# Patient Record
Sex: MALE | Race: WHITE | NOT HISPANIC OR LATINO | Employment: FULL TIME | URBAN - NONMETROPOLITAN AREA
[De-identification: names, ages, dates, MRNs, and addresses within clinical notes are randomized per-mention and may not be internally consistent; named-entity substitution may affect disease eponyms.]

---

## 2022-08-22 ENCOUNTER — HOSPITAL ENCOUNTER (EMERGENCY)
Facility: HOSPITAL | Age: 37
Discharge: HOME | End: 2022-08-22
Attending: STUDENT IN AN ORGANIZED HEALTH CARE EDUCATION/TRAINING PROGRAM | Admitting: STUDENT IN AN ORGANIZED HEALTH CARE EDUCATION/TRAINING PROGRAM

## 2022-08-22 ENCOUNTER — APPOINTMENT (OUTPATIENT)
Dept: ULTRASOUND IMAGING | Facility: HOSPITAL | Age: 37
End: 2022-08-22

## 2022-08-22 VITALS
OXYGEN SATURATION: 95 % | DIASTOLIC BLOOD PRESSURE: 74 MMHG | HEIGHT: 66 IN | WEIGHT: 315 LBS | RESPIRATION RATE: 20 BRPM | TEMPERATURE: 98.1 F | SYSTOLIC BLOOD PRESSURE: 133 MMHG | BODY MASS INDEX: 50.62 KG/M2 | HEART RATE: 81 BPM

## 2022-08-22 DIAGNOSIS — R60.9 PERIPHERAL EDEMA: Primary | ICD-10-CM

## 2022-08-22 LAB
ALBUMIN SERPL-MCNC: 4.11 G/DL (ref 3.5–5.2)
ALBUMIN/GLOB SERPL: 1.5 G/DL
ALP SERPL-CCNC: 80 U/L (ref 39–117)
ALT SERPL W P-5'-P-CCNC: 30 U/L (ref 1–41)
ANION GAP SERPL CALCULATED.3IONS-SCNC: 7 MMOL/L (ref 5–15)
AST SERPL-CCNC: 22 U/L (ref 1–40)
BASOPHILS # BLD AUTO: 0.05 10*3/MM3 (ref 0–0.2)
BASOPHILS NFR BLD AUTO: 0.5 % (ref 0–1.5)
BILIRUB SERPL-MCNC: 0.2 MG/DL (ref 0–1.2)
BUN SERPL-MCNC: 17 MG/DL (ref 6–20)
BUN/CREAT SERPL: 22.7 (ref 7–25)
CALCIUM SPEC-SCNC: 9.5 MG/DL (ref 8.6–10.5)
CHLORIDE SERPL-SCNC: 106 MMOL/L (ref 98–107)
CO2 SERPL-SCNC: 26 MMOL/L (ref 22–29)
CREAT SERPL-MCNC: 0.75 MG/DL (ref 0.76–1.27)
CRP SERPL-MCNC: 1.35 MG/DL (ref 0–0.5)
DEPRECATED RDW RBC AUTO: 47.4 FL (ref 37–54)
EGFRCR SERPLBLD CKD-EPI 2021: 119.2 ML/MIN/1.73
EOSINOPHIL # BLD AUTO: 0.32 10*3/MM3 (ref 0–0.4)
EOSINOPHIL NFR BLD AUTO: 3 % (ref 0.3–6.2)
ERYTHROCYTE [DISTWIDTH] IN BLOOD BY AUTOMATED COUNT: 14.8 % (ref 12.3–15.4)
ERYTHROCYTE [SEDIMENTATION RATE] IN BLOOD: 32 MM/HR (ref 0–15)
GLOBULIN UR ELPH-MCNC: 2.7 GM/DL
GLUCOSE SERPL-MCNC: 110 MG/DL (ref 65–99)
HCT VFR BLD AUTO: 42.9 % (ref 37.5–51)
HGB BLD-MCNC: 13.4 G/DL (ref 13–17.7)
HOLD SPECIMEN: NORMAL
HOLD SPECIMEN: NORMAL
IMM GRANULOCYTES # BLD AUTO: 0.03 10*3/MM3 (ref 0–0.05)
IMM GRANULOCYTES NFR BLD AUTO: 0.3 % (ref 0–0.5)
LYMPHOCYTES # BLD AUTO: 3.08 10*3/MM3 (ref 0.7–3.1)
LYMPHOCYTES NFR BLD AUTO: 28.7 % (ref 19.6–45.3)
MCH RBC QN AUTO: 27.5 PG (ref 26.6–33)
MCHC RBC AUTO-ENTMCNC: 31.2 G/DL (ref 31.5–35.7)
MCV RBC AUTO: 88.1 FL (ref 79–97)
MONOCYTES # BLD AUTO: 1.01 10*3/MM3 (ref 0.1–0.9)
MONOCYTES NFR BLD AUTO: 9.4 % (ref 5–12)
NEUTROPHILS NFR BLD AUTO: 58.1 % (ref 42.7–76)
NEUTROPHILS NFR BLD AUTO: 6.25 10*3/MM3 (ref 1.7–7)
NRBC BLD AUTO-RTO: 0 /100 WBC (ref 0–0.2)
PLATELET # BLD AUTO: 285 10*3/MM3 (ref 140–450)
PMV BLD AUTO: 9.7 FL (ref 6–12)
POTASSIUM SERPL-SCNC: 3.9 MMOL/L (ref 3.5–5.2)
PROT SERPL-MCNC: 6.8 G/DL (ref 6–8.5)
RBC # BLD AUTO: 4.87 10*6/MM3 (ref 4.14–5.8)
SODIUM SERPL-SCNC: 139 MMOL/L (ref 136–145)
WBC NRBC COR # BLD: 10.74 10*3/MM3 (ref 3.4–10.8)
WHOLE BLOOD HOLD COAG: NORMAL
WHOLE BLOOD HOLD SPECIMEN: NORMAL

## 2022-08-22 PROCEDURE — 93971 EXTREMITY STUDY: CPT

## 2022-08-22 PROCEDURE — 36415 COLL VENOUS BLD VENIPUNCTURE: CPT

## 2022-08-22 PROCEDURE — 85025 COMPLETE CBC W/AUTO DIFF WBC: CPT | Performed by: PHYSICIAN ASSISTANT

## 2022-08-22 PROCEDURE — 85652 RBC SED RATE AUTOMATED: CPT | Performed by: PHYSICIAN ASSISTANT

## 2022-08-22 PROCEDURE — 99283 EMERGENCY DEPT VISIT LOW MDM: CPT

## 2022-08-22 PROCEDURE — 80053 COMPREHEN METABOLIC PANEL: CPT | Performed by: PHYSICIAN ASSISTANT

## 2022-08-22 PROCEDURE — 86140 C-REACTIVE PROTEIN: CPT | Performed by: PHYSICIAN ASSISTANT

## 2022-08-22 RX ORDER — CLOTRIMAZOLE AND BETAMETHASONE DIPROPIONATE 10; .64 MG/G; MG/G
1 CREAM TOPICAL 2 TIMES DAILY
Qty: 45 G | Refills: 0 | Status: SHIPPED | OUTPATIENT
Start: 2022-08-22

## 2022-08-22 RX ORDER — HYDROXYZINE PAMOATE 25 MG/1
25 CAPSULE ORAL NIGHTLY
Qty: 7 CAPSULE | Refills: 0 | Status: SHIPPED | OUTPATIENT
Start: 2022-08-22

## 2022-08-22 RX ORDER — FUROSEMIDE 20 MG/1
20 TABLET ORAL DAILY
Qty: 7 TABLET | Refills: 0 | Status: SHIPPED | OUTPATIENT
Start: 2022-08-22

## 2022-08-22 NOTE — ED NOTES
MEDICAL SCREENING:    Reason for Visit: right leg swelling and rash     Patient initially seen in triage.  The patient was advised further evaluation and diagnostic testing will be needed, some of the treatment and testing will be initiated in the lobby in order to begin the process.  The patient will be returned to the waiting area for the time being and possibly be re-assessed by a subsequent ED provider.  The patient will be brought back to the treatment area in as timely manner as possible.       Marisol Howell PA  08/22/22 [id number]

## 2022-08-23 NOTE — ED PROVIDER NOTES
Subjective   37-year-old male presents to the ER with chief complaint of bilateral leg swelling.  Patient states symptoms have been going on for the last several days.  Patient denies any new medications.  Patient is also complaining of a pruritic rash to his right leg.  Patient denies any new laundry detergents soaps or lotions.  Patient denies being outside.  Social history positive patient is a  and has been on his feet about the same as his normal daily activities require.          Review of Systems   Constitutional: Negative.  Negative for fever.   HENT: Negative.    Respiratory: Negative.    Cardiovascular: Positive for leg swelling. Negative for chest pain.   Gastrointestinal: Negative.  Negative for abdominal pain.   Endocrine: Negative.    Genitourinary: Negative.  Negative for dysuria.   Skin: Positive for rash.   Neurological: Negative.    Psychiatric/Behavioral: Negative.    All other systems reviewed and are negative.      No past medical history on file.    No Known Allergies    No past surgical history on file.    No family history on file.    Social History     Socioeconomic History   • Marital status:            Objective   Physical Exam  Vitals and nursing note reviewed.   Constitutional:       General: He is not in acute distress.     Appearance: He is well-developed. He is not diaphoretic.   HENT:      Head: Normocephalic and atraumatic.      Right Ear: External ear normal.      Left Ear: External ear normal.      Nose: Nose normal.   Eyes:      Conjunctiva/sclera: Conjunctivae normal.   Neck:      Vascular: No JVD.      Trachea: No tracheal deviation.   Cardiovascular:      Rate and Rhythm: Normal rate.      Heart sounds: No murmur heard.  Pulmonary:      Effort: Pulmonary effort is normal. No respiratory distress.      Breath sounds: No wheezing.   Abdominal:      Palpations: Abdomen is soft.      Tenderness: There is no abdominal tenderness.   Musculoskeletal:          General: No deformity. Normal range of motion.      Cervical back: Normal range of motion and neck supple.      Right lower leg: Edema present.      Left lower leg: Edema present.      Comments: Nonpitting edema bilaterally.  Could be more of a venous stasis insufficiency.   Skin:     General: Skin is warm and dry.      Coloration: Skin is not pale.      Findings: No erythema or rash.   Neurological:      Mental Status: He is alert and oriented to person, place, and time.      Cranial Nerves: No cranial nerve deficit.   Psychiatric:         Behavior: Behavior normal.         Thought Content: Thought content normal.         Procedures           ED Course  ED Course as of 08/22/22 2229   Mon Aug 22, 2022   2214 US rad interpreted:  No deep venous thrombus in the right lower extremity.      4.4 cm complex cystic structure right popliteal fossa compatible with a Baker's cyst. [RB]      ED Course User Index  [RB] Pop Quintero II, PA                                           MDM  Number of Diagnoses or Management Options  Peripheral edema: new and requires workup     Amount and/or Complexity of Data Reviewed  Clinical lab tests: ordered and reviewed  Tests in the radiology section of CPT®: ordered and reviewed    Risk of Complications, Morbidity, and/or Mortality  Presenting problems: low  Diagnostic procedures: low  Management options: low    Patient Progress  Patient progress: stable      Final diagnoses:   Peripheral edema       ED Disposition  ED Disposition     ED Disposition   Discharge    Condition   Stable    Comment   --             PATIENT CONNECTION - [facility]  See Provider List  [place] [address]  [phone]  Schedule an appointment as soon as possible for a visit            Medication List      New Prescriptions    clotrimazole-betamethasone 1-0.05 % cream  Commonly known as: LOTRISONE  Apply 1 application topically to the appropriate area as directed 2 (Two) Times a Day.     furosemide 20 MG  tablet  Commonly known as: LASIX  Take 1 tablet by mouth Daily.     hydrOXYzine pamoate 25 MG capsule  Commonly known as: Vistaril  Take 1 capsule by mouth Every Night.           Where to Get Your Medications      These medications were sent to [id number] - [place], [place] - [address] AT [facility] - [phone] [facility] [phone]   [address] [address]    Phone: [phone]   · clotrimazole-betamethasone 1-0.05 % cream  · furosemide 20 MG tablet  · hydrOXYzine pamoate 25 MG capsule          Pop Quintero II, PA  08/22/22 9285

## 2023-04-28 ENCOUNTER — TRANSCRIBE ORDERS (OUTPATIENT)
Dept: ADMINISTRATIVE | Facility: HOSPITAL | Age: 38
End: 2023-04-28
Payer: COMMERCIAL

## 2023-04-28 DIAGNOSIS — R10.9 ABDOMINAL PAIN, UNSPECIFIED ABDOMINAL LOCATION: Primary | ICD-10-CM

## 2023-05-01 ENCOUNTER — TRANSCRIBE ORDERS (OUTPATIENT)
Dept: ADMINISTRATIVE | Facility: HOSPITAL | Age: 38
End: 2023-05-01
Payer: COMMERCIAL

## 2023-05-01 DIAGNOSIS — R10.9 ABDOMINAL PAIN, UNSPECIFIED ABDOMINAL LOCATION: Primary | ICD-10-CM

## 2023-05-01 DIAGNOSIS — R74.8 ELEVATED LIPASE: ICD-10-CM

## 2023-05-11 ENCOUNTER — HOSPITAL ENCOUNTER (OUTPATIENT)
Dept: ULTRASOUND IMAGING | Facility: HOSPITAL | Age: 38
Discharge: HOME | End: 2023-05-11
Payer: COMMERCIAL

## 2023-05-11 DIAGNOSIS — R10.9 ABDOMINAL PAIN, UNSPECIFIED ABDOMINAL LOCATION: ICD-10-CM

## 2023-05-11 DIAGNOSIS — R74.8 ELEVATED LIPASE: ICD-10-CM

## 2023-05-11 PROCEDURE — 76705 ECHO EXAM OF ABDOMEN: CPT | Performed by: RADIOLOGY

## 2023-05-11 PROCEDURE — 76705 ECHO EXAM OF ABDOMEN: CPT

## 2023-05-17 DIAGNOSIS — M25.561 RIGHT KNEE PAIN, UNSPECIFIED CHRONICITY: Primary | ICD-10-CM

## 2023-05-18 ENCOUNTER — HOSPITAL ENCOUNTER (OUTPATIENT)
Dept: GENERAL RADIOLOGY | Facility: HOSPITAL | Age: 38
Discharge: HOME | End: 2023-05-18
Payer: COMMERCIAL

## 2023-05-18 ENCOUNTER — OFFICE VISIT (OUTPATIENT)
Dept: ORTHOPEDIC SURGERY | Facility: CLINIC | Age: 38
End: 2023-05-18

## 2023-05-18 VITALS
WEIGHT: 315 LBS | BODY MASS INDEX: 50.62 KG/M2 | SYSTOLIC BLOOD PRESSURE: 133 MMHG | DIASTOLIC BLOOD PRESSURE: 81 MMHG | HEART RATE: 68 BPM | HEIGHT: 66 IN

## 2023-05-18 DIAGNOSIS — M25.561 RIGHT KNEE PAIN, UNSPECIFIED CHRONICITY: ICD-10-CM

## 2023-05-18 DIAGNOSIS — M25.561 RIGHT KNEE PAIN, UNSPECIFIED CHRONICITY: Primary | ICD-10-CM

## 2023-05-18 PROCEDURE — 73562 X-RAY EXAM OF KNEE 3: CPT | Performed by: RADIOLOGY

## 2023-05-18 PROCEDURE — 73562 X-RAY EXAM OF KNEE 3: CPT

## 2023-05-18 RX ADMIN — LIDOCAINE HYDROCHLORIDE 5 ML: 10 INJECTION, SOLUTION EPIDURAL; INFILTRATION; INTRACAUDAL; PERINEURAL at 21:20

## 2023-05-18 RX ADMIN — METHYLPREDNISOLONE ACETATE 80 MG: 80 INJECTION, SUSPENSION INTRA-ARTICULAR; INTRALESIONAL; INTRAMUSCULAR; SOFT TISSUE at 21:20

## 2023-06-01 RX ORDER — LIDOCAINE HYDROCHLORIDE 10 MG/ML
5 INJECTION, SOLUTION EPIDURAL; INFILTRATION; INTRACAUDAL; PERINEURAL
Status: COMPLETED | OUTPATIENT
Start: 2023-05-18 | End: 2023-05-18

## 2023-06-01 RX ORDER — METHYLPREDNISOLONE ACETATE 80 MG/ML
80 INJECTION, SUSPENSION INTRA-ARTICULAR; INTRALESIONAL; INTRAMUSCULAR; SOFT TISSUE
Status: COMPLETED | OUTPATIENT
Start: 2023-05-18 | End: 2023-05-18

## 2023-06-02 NOTE — PROGRESS NOTES
[facility] Orthopaedic Surgery New Patient Visit          Patient: Emmett Valladares  YOB: 1985  Date of Encounter: 05/18/2023  PCP: Judy Weinstein MD      Subjective     Chief Complaint   Patient presents with   • Right Knee - Pain, Initial Evaluation           History of Present Illness:     Emmett Valladares is a 38 y.o. male presents today as result of right knee pain onset December 2022.  Patient states that he has no specific injury.  He reports intermittent pain and has difficulty upon posterior medial knee with range of motion and weightbearing.  Patient reports difficulty upon squatting and kneeling.  He has had a previous ultrasound that revealed a Baker's cyst.  He has undergone no other conservative treatment.  Occasionally he will implement subtherapeutic anti-inflammatory medication.  He denies any other residual pain or paresthesias.        There is no problem list on file for this patient.    Past Medical History:   Diagnosis Date   • Sleep apnea      History reviewed. No pertinent surgical history.  Social History     Occupational History   • Not on file   Tobacco Use   • Smoking status: Former     Types: Cigarettes   • Smokeless tobacco: Never   Vaping Use   • Vaping Use: Never used   Substance and Sexual Activity   • Alcohol use: Yes     Comment: occasional   • Drug use: Never   • Sexual activity: Defer    Emmett Valladares  reports that he has quit smoking. His smoking use included cigarettes. He has never used smokeless tobacco.. I have educated him on the risk of diseases from using tobacco products such as cancer, COPD and heart disease.           Social History     Social History Narrative   • Not on file     Family History   Problem Relation Age of Onset   • Gout Mother    • Cancer Maternal Grandfather      Current Outpatient Medications   Medication Sig Dispense Refill   • clotrimazole-betamethasone (LOTRISONE) 1-0.05 % cream Apply 1 application topically to the appropriate area as  "directed 2 (Two) Times a Day. 45 g 0   • furosemide (LASIX) 20 MG tablet Take 1 tablet by mouth Daily. 7 tablet 0   • hydrOXYzine pamoate (Vistaril) 25 MG capsule Take 1 capsule by mouth Every Night. 7 capsule 0     No current facility-administered medications for this visit.     No Known Allergies         Review of Systems   Constitutional: Negative.   HENT: Negative.         Sinus pain   Eyes: Negative.    Cardiovascular: Negative.    Respiratory: Negative.         Sleep apnea   Endocrine: Negative.    Hematologic/Lymphatic: Negative.    Skin: Negative.    Musculoskeletal: Positive for joint swelling.        Pertinent positives listed in HPI   Gastrointestinal: Negative.    Genitourinary: Negative.    Neurological: Positive for numbness.   Psychiatric/Behavioral: Negative.    Allergic/Immunologic: Negative.          Objective      Vitals:    05/18/23 1017   BP: 133/81   Pulse: 68   Weight: (!) 174 kg (383 lb 9.6 oz)   Height: 167.6 cm (65.98\")      Class 3 Severe Obesity (BMI >=40). Obesity-related health conditions include the following: listed in PMH. Obesity is newly identified. BMI is is above average; BMI management plan is completed. We discussed portion control and increasing exercise.      Physical Exam  Vitals and nursing note reviewed.   Constitutional:       General: He is not in acute distress.     Appearance: Normal appearance. He is not ill-appearing.   HENT:      Head: Normocephalic and atraumatic.      Right Ear: External ear normal.      Left Ear: External ear normal.      Nose: Nose normal.      Mouth/Throat:      Mouth: Mucous membranes are moist.      Pharynx: Oropharynx is clear.   Eyes:      Extraocular Movements: Extraocular movements intact.      Conjunctiva/sclera: Conjunctivae normal.      Pupils: Pupils are equal, round, and reactive to light.   Cardiovascular:      Rate and Rhythm: Normal rate.      Pulses: Normal pulses.   Pulmonary:      Effort: Pulmonary effort is normal. "   Abdominal:      General: There is no distension.   Musculoskeletal:      Cervical back: Normal range of motion. No rigidity.      Comments: Right knee on examination today reveals scant effusion medial joint line tenderness with palpation.  Patient exhibits no significant laxity upon varus/valgus stress.  Lachman and drawer testing negative.  Neurovascular status grossly intact right lower extremity.       Skin:     General: Skin is warm and dry.      Capillary Refill: Capillary refill takes less than 2 seconds.   Neurological:      General: No focal deficit present.      Mental Status: He is alert and oriented to person, place, and time.   Psychiatric:         Mood and Affect: Mood normal.         Behavior: Behavior normal.                 Radiology:      XR Knee 3 View Right    Result Date: 5/18/2023    No acute findings in the right knee.  This report was finalized on 5/18/2023 10:22 AM by Dr. Yevgeniy Faye MD.      US Abdomen Limited    Result Date: 5/11/2023  No definitive source for the patient's symptoms.  This report was finalized on 5/11/2023 11:12 AM by Dr. Yevgeniy Faye MD.              Assessment/Plan        ICD-10-CM ICD-9-CM   1. Right knee pain, unspecified chronicity  M25.561 719.46       38-year-old male with notable mild right knee degenerative changes.  The patient has pain and symptoms concerning for early onset osteoarthritis versus potential internal derangement.  As a therapeutic and diagnostic approach the patient was provided today with a right knee intra-articular steroid injection 80 mg Depo-Medrol with lidocaine block injected into the intra-articular space of the right knee.  The patient tolerated this procedure well.  He was instructed to return back in 4 weeks for further evaluation of the efficacy of the conservative treatment.  No direct surgical indication currently.      Large Joint Arthrocentesis: R knee  Date/Time: 5/18/2023 9:20 PM  Consent given by: patient  Site marked: site  marked  Supporting Documentation  Indications: pain and diagnostic evaluation   Procedure Details  Location: knee - R knee  Needle size: 25 G  Approach: anterolateral  Medications administered: 80 mg methylPREDNISolone acetate 80 MG/ML; 5 mL lidocaine PF 1% 1 %  Patient tolerance: patient tolerated the procedure well with no immediate complications                      This document was signed by Merrill Aviles PA-C May 18, 2023    CC: Judy Weinstein MD      Dictated Utilizing Dragon Dictation:   Please note that portions of this note were completed with a voice recognition program.   Part of this note may be an electronic transcription/translation of spoken language to printed text using the Dragon Dictation System.

## 2023-11-10 ENCOUNTER — TRANSCRIBE ORDERS (OUTPATIENT)
Dept: ADMINISTRATIVE | Facility: HOSPITAL | Age: 38
End: 2023-11-10
Payer: COMMERCIAL

## 2023-11-10 DIAGNOSIS — R10.11 ABDOMINAL PAIN, RIGHT UPPER QUADRANT: Primary | ICD-10-CM

## 2023-11-17 ENCOUNTER — TRANSCRIBE ORDERS (OUTPATIENT)
Dept: ADMINISTRATIVE | Facility: HOSPITAL | Age: 38
End: 2023-11-17
Payer: COMMERCIAL

## 2023-11-17 DIAGNOSIS — R10.9 ABDOMINAL PAIN, UNSPECIFIED ABDOMINAL LOCATION: Primary | ICD-10-CM

## 2024-01-11 ENCOUNTER — OFFICE VISIT (OUTPATIENT)
Dept: PULMONOLOGY | Facility: CLINIC | Age: 39
End: 2024-01-11
Payer: COMMERCIAL

## 2024-01-11 VITALS
BODY MASS INDEX: 50.62 KG/M2 | HEIGHT: 66 IN | SYSTOLIC BLOOD PRESSURE: 146 MMHG | HEART RATE: 101 BPM | DIASTOLIC BLOOD PRESSURE: 82 MMHG | WEIGHT: 315 LBS | OXYGEN SATURATION: 95 %

## 2024-01-11 DIAGNOSIS — G47.33 OSA (OBSTRUCTIVE SLEEP APNEA): Primary | ICD-10-CM

## 2024-01-11 DIAGNOSIS — E66.01 CLASS 3 SEVERE OBESITY DUE TO EXCESS CALORIES WITH BODY MASS INDEX (BMI) OF 60.0 TO 69.9 IN ADULT, UNSPECIFIED WHETHER SERIOUS COMORBIDITY PRESENT: ICD-10-CM

## 2024-01-11 PROBLEM — E66.813 CLASS 3 SEVERE OBESITY DUE TO EXCESS CALORIES WITH BODY MASS INDEX (BMI) OF 60.0 TO 69.9 IN ADULT: Status: ACTIVE | Noted: 2024-01-11

## 2024-01-11 RX ORDER — OMEPRAZOLE 20 MG/1
CAPSULE, DELAYED RELEASE ORAL
COMMUNITY
Start: 2023-11-10 | End: 2024-01-11

## 2024-01-11 NOTE — PROGRESS NOTES
Subjective    Emmett Valladares presents for the following Sleep Apnea  .    History of Present Illness   Were you born premature?  no    Any Childhood infections? no      Breathing problems when you were a child? no    Any childhood allergies?    no             At what age did you begin smoking? no    Smoking marijuana? no    Any IV drugs? no    How many packs per day? 0    Lung Function Test? no  Chest X-Ray? yes    CT Chest? yes Allergy Test? no    Family hx of Lung disease or Lung Cancer?no    If FHx is posivitive for lung cancer, what is the relationship of the family member?     Any hospitalization in the last year? no    How far can you walk without getting short of breath? Non very far    Any coughing? no    Any wheezing? no    Acid Reflux? no    Do you snore? no    Daytime Fatigue? yes    Any pets? yes   Any pet allergies? 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 no    What inhalers are you currently using? None     Have you had the Influenza Vaccine?    Would you like to receive this Vaccine today? no    Have you had the Pneumonia Vaccine?  no  Would you like to receive this Vaccine today? No    Above-mentioned questionnaire was reviewed by me in great detail.  Review of Systems  Positive for fatigue and generalized tiredness active Problems:  Problem List Items Addressed This Visit          Endocrine and Metabolic    Class 3 severe obesity due to excess calories with body mass index (BMI) of 60.0 to 69.9 in adult       Sleep    JASEN (obstructive sleep apnea) - Primary    Relevant Orders    Miscellaneous DME       Past Medical History:  Past Medical History:   Diagnosis Date    Sleep apnea        Family History:  Family History   Problem Relation Age of Onset    Gout Mother     Cancer Maternal Grandfather        Social History:  Social History     Tobacco Use    Smoking status: Former     Types: Cigarettes    Smokeless tobacco: Never   Substance Use  "Topics    Alcohol use: Yes     Comment: occasional       Current Medications:  Current Outpatient Medications   Medication Sig Dispense Refill    Semaglutide-Weight Management (Wegovy) 0.25 MG/0.5ML solution auto-injector Inject 0.25mg subcutaneously once weekly on the same day of each week. 2 mL 3     No current facility-administered medications for this visit.       Allergies:  No Known Allergies    Vitals:  /82   Pulse 101   Ht 167.6 cm (65.98\")   Wt (!) 176 kg (387 lb)   SpO2 95%   BMI 62.49 kg/m²     Imaging:    Imaging Results (Most Recent)       None            Pulmonary Functions Testing Results:    No results found for: \"FEV1\", \"FVC\", \"PPD4RTF\", \"TLC\", \"DLCO\"    Results for orders placed or performed during the hospital encounter of 08/22/22   Comprehensive Metabolic Panel    Specimen: Blood   Result Value Ref Range    Glucose 110 (H) 65 - 99 mg/dL    BUN 17 6 - 20 mg/dL    Creatinine 0.75 (L) 0.76 - 1.27 mg/dL    Sodium 139 136 - 145 mmol/L    Potassium 3.9 3.5 - 5.2 mmol/L    Chloride 106 98 - 107 mmol/L    CO2 26.0 22.0 - 29.0 mmol/L    Calcium 9.5 8.6 - 10.5 mg/dL    Total Protein 6.8 6.0 - 8.5 g/dL    Albumin 4.11 3.50 - 5.20 g/dL    ALT (SGPT) 30 1 - 41 U/L    AST (SGOT) 22 1 - 40 U/L    Alkaline Phosphatase 80 39 - 117 U/L    Total Bilirubin 0.2 0.0 - 1.2 mg/dL    Globulin 2.7 gm/dL    A/G Ratio 1.5 g/dL    BUN/Creatinine Ratio 22.7 7.0 - 25.0    Anion Gap 7.0 5.0 - 15.0 mmol/L    eGFR 119.2 >60.0 mL/min/1.73   C-reactive Protein    Specimen: Blood   Result Value Ref Range    C-Reactive Protein 1.35 (H) 0.00 - 0.50 mg/dL   Sedimentation Rate    Specimen: Blood   Result Value Ref Range    Sed Rate 32 (H) 0 - 15 mm/hr   CBC Auto Differential    Specimen: Blood   Result Value Ref Range    WBC 10.74 3.40 - 10.80 10*3/mm3    RBC 4.87 4.14 - 5.80 10*6/mm3    Hemoglobin 13.4 13.0 - 17.7 g/dL    Hematocrit 42.9 37.5 - 51.0 %    MCV 88.1 79.0 - 97.0 fL    MCH 27.5 26.6 - 33.0 pg    MCHC 31.2 (L) " 31.5 - 35.7 g/dL    RDW 14.8 12.3 - 15.4 %    RDW-SD 47.4 37.0 - 54.0 fl    MPV 9.7 6.0 - 12.0 fL    Platelets 285 140 - 450 10*3/mm3    Neutrophil % 58.1 42.7 - 76.0 %    Lymphocyte % 28.7 19.6 - 45.3 %    Monocyte % 9.4 5.0 - 12.0 %    Eosinophil % 3.0 0.3 - 6.2 %    Basophil % 0.5 0.0 - 1.5 %    Immature Grans % 0.3 0.0 - 0.5 %    Neutrophils, Absolute 6.25 1.70 - 7.00 10*3/mm3    Lymphocytes, Absolute 3.08 0.70 - 3.10 10*3/mm3    Monocytes, Absolute 1.01 (H) 0.10 - 0.90 10*3/mm3    Eosinophils, Absolute 0.32 0.00 - 0.40 10*3/mm3    Basophils, Absolute 0.05 0.00 - 0.20 10*3/mm3    Immature Grans, Absolute 0.03 0.00 - 0.05 10*3/mm3    nRBC 0.0 0.0 - 0.2 /100 WBC   Green Top (Gel)   Result Value Ref Range    Extra Tube Hold for add-ons.    Lavender Top   Result Value Ref Range    Extra Tube hold for add-on    Gold Top - SST   Result Value Ref Range    Extra Tube Hold for add-ons.    Light Blue Top   Result Value Ref Range    Extra Tube Hold for add-ons.        Objective   Physical Exam  General- obese in appearance, not in any acute distress    HEENT- pupils equally reactive to light, normal in size, no scleral icterus    Neck-supple    Respiratory-respirations normal-on auscultation no wheezing no crackles,     Cardiovascular-  Normal S1 and S2. No S3, S4 or murmurs. No JVD, no carotid bruit and no edema, pulses normal bilaterally     GI-nontender nondistended bowel sounds positive    CNS-nonfocal    Musculoskeletal -no edema  Extremities- no obvious deformity noticed     Psychiatric-mood good, good eye contact, alert awake oriented  Skin- no visible rash      Assessment & Plan   JASEN- on cpap  was educated about ill health effects of sleep apnea and what all effects it can have on health in long-term.  Which includes blood clots, arrhythmias, stroke, depression, hypothyroidism. was also educated about the pathophysiology of sleep apnea and how weight contributes in it.  Patient understood the conversation well and  will try and do exercise and eat right kind of food to lose weight.     Has gained significant amount of weight 40 pounds.  I called patient's CPAP machine company and verified patient's settings.  Patient was on AutoPap with settings 5-15.  As patient has gained significant amount of weight we will adjust the settings.  Minimum EPAP changed to 12 and maximum to 18.  If this does not work will change to BiPAP.  This was explained to the patient.    Obese-did diet and exercise counseling.  Body mass index is 62.49 kg/m².           ICD-10-CM ICD-9-CM   1. JASEN (obstructive sleep apnea)  G47.33 327.23   2. Class 3 severe obesity due to excess calories with body mass index (BMI) of 60.0 to 69.9 in adult, unspecified whether serious comorbidity present  E66.01 278.01    Z68.44 V85.44       No follow-ups on file.

## 2024-01-12 ENCOUNTER — PATIENT ROUNDING (BHMG ONLY) (OUTPATIENT)
Dept: PULMONOLOGY | Facility: CLINIC | Age: 39
End: 2024-01-12
Payer: COMMERCIAL

## 2024-01-12 NOTE — PROGRESS NOTES
January 12, 2024    Hello, may I speak with Emmett Valladares?    My name is Lynne James      I am  with [facility]  [address]  [place] [place] [facility] [address] [phone].    Before we get started may I verify your date of birth? 1985    I am calling to officially welcome you to our practice and ask about your recent visit. Is this a good time to talk? yes    Tell me about your visit with us. What things went well?  Everyone was very nice, I was pleased with the care I received, but I'm still waiting to get an answer on my Bipap machine from [employer]       We're always looking for ways to make our patients' experiences even better. Do you have recommendations on ways we may improve?  no    Overall were you satisfied with your first visit to our practice? yes       I appreciate you taking the time to speak with me today. Is there anything else I can do for you? no      Thank you, and have a great day.

## 2024-02-01 ENCOUNTER — LAB (OUTPATIENT)
Dept: LAB | Facility: HOSPITAL | Age: 39
End: 2024-02-01
Payer: COMMERCIAL

## 2024-02-01 ENCOUNTER — DISEASE STATE MANAGEMENT VISIT (OUTPATIENT)
Dept: PHARMACY | Facility: HOSPITAL | Age: 39
End: 2024-02-01
Payer: COMMERCIAL

## 2024-02-01 VITALS
BODY MASS INDEX: 61.36 KG/M2 | HEART RATE: 80 BPM | WEIGHT: 315 LBS | SYSTOLIC BLOOD PRESSURE: 163 MMHG | DIASTOLIC BLOOD PRESSURE: 97 MMHG

## 2024-02-01 DIAGNOSIS — E66.01 CLASS 3 SEVERE OBESITY DUE TO EXCESS CALORIES WITHOUT SERIOUS COMORBIDITY WITH BODY MASS INDEX (BMI) OF 60.0 TO 69.9 IN ADULT: Primary | ICD-10-CM

## 2024-02-01 DIAGNOSIS — E66.01 CLASS 3 SEVERE OBESITY DUE TO EXCESS CALORIES WITHOUT SERIOUS COMORBIDITY WITH BODY MASS INDEX (BMI) OF 60.0 TO 69.9 IN ADULT: ICD-10-CM

## 2024-02-01 LAB
ALBUMIN SERPL-MCNC: 4.3 G/DL (ref 3.5–5.2)
ALBUMIN/GLOB SERPL: 1.7 G/DL
ALP SERPL-CCNC: 69 U/L (ref 39–117)
ALT SERPL W P-5'-P-CCNC: 24 U/L (ref 1–41)
ANION GAP SERPL CALCULATED.3IONS-SCNC: 9 MMOL/L (ref 5–15)
AST SERPL-CCNC: 20 U/L (ref 1–40)
BILIRUB SERPL-MCNC: 0.5 MG/DL (ref 0–1.2)
BUN SERPL-MCNC: 19 MG/DL (ref 6–20)
BUN/CREAT SERPL: 22.1 (ref 7–25)
CALCIUM SPEC-SCNC: 9.5 MG/DL (ref 8.6–10.5)
CHLORIDE SERPL-SCNC: 105 MMOL/L (ref 98–107)
CHOLEST SERPL-MCNC: 143 MG/DL (ref 0–200)
CO2 SERPL-SCNC: 25 MMOL/L (ref 22–29)
CREAT SERPL-MCNC: 0.86 MG/DL (ref 0.76–1.27)
EGFRCR SERPLBLD CKD-EPI 2021: 113.7 ML/MIN/1.73
GLOBULIN UR ELPH-MCNC: 2.6 GM/DL
GLUCOSE SERPL-MCNC: 87 MG/DL (ref 65–99)
HBA1C MFR BLD: 5.7 % (ref 4.8–5.6)
HDLC SERPL-MCNC: 47 MG/DL (ref 40–60)
LDLC SERPL CALC-MCNC: 85 MG/DL (ref 0–100)
LDLC/HDLC SERPL: 1.84 {RATIO}
POTASSIUM SERPL-SCNC: 4.5 MMOL/L (ref 3.5–5.2)
PROT SERPL-MCNC: 6.9 G/DL (ref 6–8.5)
SODIUM SERPL-SCNC: 139 MMOL/L (ref 136–145)
T-UPTAKE NFR SERPL: 1.07 TBI (ref 0.8–1.3)
T4 SERPL-MCNC: 7.3 MCG/DL (ref 4.5–11.7)
TRIGL SERPL-MCNC: 48 MG/DL (ref 0–150)
TSH SERPL DL<=0.05 MIU/L-ACNC: 2.5 UIU/ML (ref 0.27–4.2)
VLDLC SERPL-MCNC: 11 MG/DL (ref 5–40)

## 2024-02-01 PROCEDURE — 84443 ASSAY THYROID STIM HORMONE: CPT

## 2024-02-01 PROCEDURE — 84479 ASSAY OF THYROID (T3 OR T4): CPT

## 2024-02-01 PROCEDURE — 84436 ASSAY OF TOTAL THYROXINE: CPT

## 2024-02-01 PROCEDURE — 80053 COMPREHEN METABOLIC PANEL: CPT

## 2024-02-01 PROCEDURE — 83036 HEMOGLOBIN GLYCOSYLATED A1C: CPT

## 2024-02-01 PROCEDURE — 80061 LIPID PANEL: CPT

## 2024-02-01 PROCEDURE — 36415 COLL VENOUS BLD VENIPUNCTURE: CPT

## 2024-02-01 RX ORDER — SEMAGLUTIDE 0.5 MG/.5ML
0.5 INJECTION, SOLUTION SUBCUTANEOUS WEEKLY
Qty: 2 ML | Refills: 0 | Status: SHIPPED | OUTPATIENT
Start: 2024-02-01

## 2024-02-01 RX ORDER — IBUPROFEN 800 MG/1
800 TABLET ORAL EVERY 8 HOURS PRN
COMMUNITY
Start: 2024-01-11

## 2024-02-01 NOTE — PROGRESS NOTES
WEGOVY™ (semaglutide)  Medication Expectations   Why am I taking this medication? You are taking Wegovy to help you lose weight and to help with weight-related medical problems.  Wegovy will also help you keep your weight controlled.     What should I expect while on this medication? You can expect some weight loss, but this may vary between patients. It should be used along with a reduced calorie diet and increased physical activity.   How does the medication work? Wegovy is an injection that works with one of your body's natural responses to weight loss.  It works like a hormone, called GLP1, that helps regulate your appetite; this helps you eat less and can lead to weight loss.  This medication also slows down food from leaving your stomach, making you feel gupta for longer.   How long will I be on this medication for? The amount of time you will be on this medication will be determined by your doctor based on your weight loss and how well you tolerate the medication. Do not abruptly stop this medication without talking to your doctor first.    How do I take this medication? Take as directed on your prescription label. Wegovy is supplied in a single-use pen for each dose and you will use a new pre-filled pen each week.  It is injected under the skin (subcutaneously) of your stomach, thigh or upper arm. Use this medication once weekly, on the same day each week, with or without food.     What are some possible side effects? You may notice you don't feel as hungry, especially when you first start using Wegovy.  Some common side effects are nausea, stomach pain, constipation, and heartburn. Redness, itching, and/or swelling can occur where the shot was given.  You should also monitor for hypoglycemia if you are taking Wegovy with other medications that cause low blood sugar.     What happens if I miss a dose? If you miss a dose, take it as soon as you remember as long as your next scheduled dose is more than 2 days  away.  If your next scheduled dose is within 2 days, take that regularly scheduled dose and skip your missed dose. If you miss more than 2 weeks of doses, call your healthcare provider to talk about how to restart your Wegovy.     Medication Safety   What are things I should warn my doctor immediately about? Do not use Wegovy if you or a family member have ever had medullary thyroid cancer (MTC) or Multiple Endocrine Neoplasia syndrome type 2 (MEN 2).  Tell your doctor if you have or have had problems with your kidneys, pancreas, or liver. Talk to your doctor if you are pregnant, planning to become pregnant, or breastfeeding or if you notice any signs/symptoms of an allergic reaction (rash, hives, difficulty breathing, etc.). Stop using Wegovy and call your doctor immediately if you have severe pain in your stomach area that will not go away.    What are things that I should be cautious of? Be cautious of any side effect from this medication. Talk to your doctor if any new ones develop or aren't getting better.   What are some medications that can interact with this one? Taking Wegovy with other medications that may also lower your blood sugar such as insulin and glipizide/glimepiride/glyburide may increase the risk of hypoglycemia. Your doctor may reduce the dose of these medications when you start Wegovy. Always tell your doctor or pharmacist immediately if you start taking any new medications, including over-the-counter medications, vitamins, and herbal supplements.      Medication Storage/Handling   How should I handle this medication? Keep this medication out of reach of pets/children and keep the pen capped    How does this medication need to be stored? Store in the refrigerator prior to first use. After first use, the pen can be kept at room temperature for up to 28 days. Do not freeze; protect from light.    How should I dispose of this medication? Place your used needles in an approved sharps container or  heavy-duty household container with a tight lid.If your doctor decides to stop this medication, take to your local police station for proper disposal. Some pharmacies also have take-back bins for medication drop-off.      Resources/Support   How can I remind myself to take this medication? You can download reminder apps to help you manage your refills or set an alarm on your phone to remind you.    Is financial support available?  [employer] can provide co-pay cards if you have commercial insurance or patient assistance if you have Medicare or no insurance.    Which vaccines are recommended for me? Talk to your doctor about these vaccines: Flu, Coronavirus (COVID-19), Pneumococcal (pneumonia), Tdap, Hepatitis B, Zoster (shingles)

## 2024-02-01 NOTE — PROGRESS NOTES
"   [facility]  [facility] Management Program      Emmett Valladares is a 38 y.o. male referred to the [facility] by Gabino Harrison for clinical pharmacy and specialty pharmacy management of GLP1 for weight management. The patient denies a personal history or family history of thyroid cancer and denies a personal history of pancreatitis.     Emmett Valladares has previously tried Adipex and Mounjaro, and Rybelsus and lifestyle changes for weight loss.   Current weight loss efforts include lifestyle changes (has cut out soda and limiting carbs by focusing on protein).     Relevant Past Medical History and Co-morbidities  Past Medical History:   Diagnosis Date    Sleep apnea      Social History     Socioeconomic History    Marital status:    Tobacco Use    Smoking status: Former     Types: Cigarettes    Smokeless tobacco: Never   Vaping Use    Vaping Use: Never used   Substance and Sexual Activity    Alcohol use: Yes     Comment: occasional    Drug use: Never    Sexual activity: Defer       Allergies  Patient has no known allergies.    Current Medication List    Current Outpatient Medications:     Semaglutide-Weight Management (Wegovy) 0.25 MG/0.5ML solution auto-injector, Inject 0.25mg subcutaneously once weekly on the same day of each week., Disp: 2 mL, Rfl: 3    Drug Interactions  No drug interactions expected with Wegovy according to current literature.     Relevant Laboratory Values  No results found for: \"CHOL\", \"CHLPL\", \"TRIG\", \"HDL\", \"LDL\", \"LDLDIRECT\"  There is no height or weight on file to calculate BMI.    Vaccinations:   Patient recommended to keep up with routine vaccinations.     Goals of Therapy  Clinical Goals or Therapeutic Targets: Prevent weight associated co-morbidities and complications      Date   02/01/24       Weight (lb) 380 lb       BMI kg/ 61.36       Waist Circumference (in)  60.5\"           Medication Assessment & Plan    Patient has current BMI of 61.36, " which is considered Class III Obesity    Will order Wegovy 0.5 mg SC weekly.    Pharmacy did not have the 0.25 mg strength in stock today, patient wanted to initiate the 0.5 mg strength at this time. Patient counseled on the potential risk of adverse effects due to starting the 0.5 mg dose instead of the lower strength. Patient verbalized understanding and wishes to proceed.     The following medications will need dose adjustments/closer monitoring once the GLP1 is started: None    Discussed lifestyle modifications, including diet and exercise.  Patient education provided.     Worked with patient to create SMART Goal(s):   Meal prep lunches for work  Exercise (with family using exercise YouTube videos) 2 days a week for 30 minutes at time.     Advised patient to get labs before next visit: Placed order for CMP, Thyroid Panel, Lipid Panel, Hgb A1C   Patient reported he was fasting and would stop by lab to get today.   Will review labs at next follow up visit.    Will follow-up with patient in clinic in 4 weeks.       Kylee Bello RPH  2/1/2024  08:52 EST

## 2024-02-29 ENCOUNTER — DISEASE STATE MANAGEMENT VISIT (OUTPATIENT)
Dept: PHARMACY | Facility: HOSPITAL | Age: 39
End: 2024-02-29
Payer: COMMERCIAL

## 2024-02-29 VITALS — BODY MASS INDEX: 59.62 KG/M2 | WEIGHT: 315 LBS

## 2024-02-29 RX ORDER — SEMAGLUTIDE 1 MG/.5ML
1 INJECTION, SOLUTION SUBCUTANEOUS WEEKLY
Qty: 2 ML | Refills: 0 | Status: SHIPPED | OUTPATIENT
Start: 2024-02-29

## 2024-02-29 NOTE — PROGRESS NOTES
[facility]  Weight Management Program      Emmett Valladares is a 38 y.o. male referred to the [facility] by Gabino Harrison for clinical pharmacy and specialty pharmacy management of GLP1 for weight management. The patient denies a personal history or family history of thyroid cancer and denies a personal history of pancreatitis.     Emmett Valladares has previously tried Adipex and Mounjaro, and Rybelsus and lifestyle changes for weight loss.   Current weight loss efforts include lifestyle changes (has cut out soda and limiting carbs by focusing on protein).     Patient currently on Wegovy 0.5 mg weekly. Denies lumps/swelling/hoarseness, abdominal pain. Reports some nausea on the first day of the injection. Not currently meal prepping, as he mainly only eats in the morning and snacks throughout the day. Patient reports that he has been doing a lot more walking at work as he works at a car [employer] Wants to titrate up his dose today.     Relevant Past Medical History and Co-morbidities  Past Medical History:   Diagnosis Date    Sleep apnea      Social History     Socioeconomic History    Marital status:    Tobacco Use    Smoking status: Former     Types: Cigarettes    Smokeless tobacco: Never   Vaping Use    Vaping Use: Never used   Substance and Sexual Activity    Alcohol use: Yes     Comment: occasional    Drug use: Never    Sexual activity: Defer       Allergies  Patient has no known allergies.    Current Medication List    Current Outpatient Medications:     ibuprofen (ADVIL,MOTRIN) 800 MG tablet, Take 1 tablet by mouth Every 8 (Eight) Hours As Needed for Mild Pain., Disp: , Rfl:     Semaglutide-Weight Management (Wegovy) 0.5 MG/0.5ML solution auto-injector, Inject 0.5 mL under the skin into the appropriate area as directed 1 (One) Time Per Week., Disp: 2 mL, Rfl: 0    Drug Interactions  No drug interactions expected with Wegovy according to current literature.     Relevant  "Laboratory Values  Lab Results   Component Value Date    CHOL 143 02/01/2024    TRIG 48 02/01/2024    HDL 47 02/01/2024    LDL 85 02/01/2024     There is no height or weight on file to calculate BMI.    Vaccinations:   Patient recommended to keep up with routine vaccinations.     Goals of Therapy  Clinical Goals or Therapeutic Targets: Prevent weight associated co-morbidities and complications      Date   02/01/24 2/29/24      Weight (lb) 380 lb 369.2 lb      BMI kg/ 61.36 59.62      Waist Circumference (in)  60.5\" 57\"          Medication Assessment & Plan    Patient has current BMI of 59.62, which is considered Class III Obesity. Patient has lost 10.8 lb total since the beginning.     Will order Wegovy 1 mg SC weekly.      The following medications will need dose adjustments/closer monitoring once the GLP1 is started: None    Discussed lifestyle modifications, including diet and exercise.  Patient education provided.     One of the BP checks included a systolic reading of >170. BP on the recheck was 158/107, HR 68. Made recommendation for ER visit with BP being elevated and patient declined. I discussed the signs and symptoms of high blood pressure and that he was at increased risk of stroke and heart attack. He stated that he needs to get back to work and that he feels fine at the moment and if he feels bad he will go to the ER. I stated that I will reach out to the referring provider Gabino Harrison about elevated BP and patient declined an appointment at this moment. Patients wife said that she will make an appointment at urgent care tomorrow for patient since he is off at that time. I reached out to Gabino Harrison and he stated that he has an appointment with the patient in May, but he will reach out to patient to see if they can move up the appointment.     Worked with patient to create SMART Goal(s):   Meal prep lunches for work  Exercise (with family using exercise YouTube videos) 2 days a week for 30 " minutes at time.       Will follow-up with patient in clinic in 4 weeks.       Erasmo France RPH  2/29/2024  08:25 EST

## 2024-03-21 ENCOUNTER — HOSPITAL ENCOUNTER (OUTPATIENT)
Dept: GENERAL RADIOLOGY | Facility: HOSPITAL | Age: 39
Discharge: HOME | End: 2024-03-21
Admitting: PHYSICIAN ASSISTANT
Payer: COMMERCIAL

## 2024-03-21 ENCOUNTER — OFFICE VISIT (OUTPATIENT)
Dept: ORTHOPEDIC SURGERY | Facility: CLINIC | Age: 39
End: 2024-03-21
Payer: COMMERCIAL

## 2024-03-21 VITALS — WEIGHT: 315 LBS | HEIGHT: 66 IN | BODY MASS INDEX: 50.62 KG/M2

## 2024-03-21 DIAGNOSIS — M17.11 PRIMARY OSTEOARTHRITIS OF RIGHT KNEE: Primary | ICD-10-CM

## 2024-03-21 DIAGNOSIS — M25.521 RIGHT ELBOW PAIN: ICD-10-CM

## 2024-03-21 PROCEDURE — 73080 X-RAY EXAM OF ELBOW: CPT | Performed by: RADIOLOGY

## 2024-03-21 PROCEDURE — 73080 X-RAY EXAM OF ELBOW: CPT

## 2024-03-21 RX ADMIN — METHYLPREDNISOLONE ACETATE 80 MG: 80 INJECTION, SUSPENSION INTRA-ARTICULAR; INTRALESIONAL; INTRAMUSCULAR; SOFT TISSUE at 10:12

## 2024-03-21 RX ADMIN — LIDOCAINE HYDROCHLORIDE 5 ML: 10 INJECTION, SOLUTION EPIDURAL; INFILTRATION; INTRACAUDAL; PERINEURAL at 10:12

## 2024-03-21 NOTE — PROGRESS NOTES
[facility] [place]          Patient: Emmett Valladares  YOB: 1985  Date of Encounter: 3/21/2024  PCP: Gabino Harrison PA      Subjective     Chief Complaint   Patient presents with    Right Knee - Follow-up, Pain    Right Elbow - Pain     New-Problem              History of Present Illness:     Emmett Valladares is a 39 y.o. male presents today for ration right knee pain osteoarthritis.  Patient's last office visit 6/8/2023.  he received intra-articular steroid injection that has been efficacious and he reports mild return of right knee pain.  He states he is beginning to have dull throbbing aching sensation upon range of motion and weightbearing.  Patient also has complaints of right elbow pain as result of a fall in which she reached out to catch himself and hurt the right elbow with dull aching sensation to the lateral aspect of the elbow worse upon range of motion and twisting or turning.  He is requesting evaluation and radiographs to rule out acute fracture.       Patient Active Problem List   Diagnosis    JASEN (obstructive sleep apnea)    Class 3 severe obesity due to excess calories with body mass index (BMI) of 60.0 to 69.9 in adult     Past Medical History:   Diagnosis Date    Sleep apnea      History reviewed. No pertinent surgical history.  Social History     Occupational History    Not on file   Tobacco Use    Smoking status: Former     Types: Cigarettes    Smokeless tobacco: Never   Vaping Use    Vaping status: Never Used   Substance and Sexual Activity    Alcohol use: Yes     Comment: occasional    Drug use: Never    Sexual activity: Defer    Emmett Valladares  reports that he has quit smoking. His smoking use included cigarettes. He has never used smokeless tobacco.. I have educated him on the risk of diseases from using tobacco products such as cancer, COPD and heart disease.           Social History     Social History Narrative    Not on file     Family  "History   Problem Relation Age of Onset    Gout Mother     Cancer Maternal Grandfather      Current Outpatient Medications   Medication Sig Dispense Refill    ibuprofen (ADVIL,MOTRIN) 800 MG tablet Take 1 tablet by mouth Every 8 (Eight) Hours As Needed for Mild Pain.      Semaglutide-Weight Management (Wegovy) 1.7 MG/0.75ML solution auto-injector Inject 0.75 mL under the skin into the appropriate area as directed 1 (One) Time Per Week. 3 mL 0     No current facility-administered medications for this visit.     No Known Allergies         Review of Systems   Constitutional: Negative.   HENT: Negative.          Sinus pain   Eyes: Negative.    Cardiovascular: Negative.    Respiratory: Negative.          Sleep apnea   Endocrine: Negative.    Hematologic/Lymphatic: Negative.    Skin: Negative.    Musculoskeletal:  Positive for joint swelling.        Pertinent positives listed in HPI   Gastrointestinal: Negative.    Genitourinary: Negative.    Neurological:  Positive for numbness.   Psychiatric/Behavioral: Negative.     Allergic/Immunologic: Negative.          Objective      Vitals:    03/21/24 0926   Weight: (!) 163 kg (360 lb)   Height: 167.6 cm (66\")      Class 3 Severe Obesity (BMI >=40). Obesity-related health conditions include the following:  listed in PMH . Obesity is newly identified. BMI is is above average; BMI management plan is completed. We discussed portion control and increasing exercise.      Physical Exam  Vitals and nursing note reviewed.   Constitutional:       General: He is not in acute distress.     Appearance: Normal appearance. He is not ill-appearing.   HENT:      Head: Normocephalic and atraumatic.      Right Ear: External ear normal.      Left Ear: External ear normal.      Nose: Nose normal.      Mouth/Throat:      Mouth: Mucous membranes are moist.      Pharynx: Oropharynx is clear.   Eyes:      Extraocular Movements: Extraocular movements intact.      Conjunctiva/sclera: Conjunctivae normal. "      Pupils: Pupils are equal, round, and reactive to light.   Cardiovascular:      Rate and Rhythm: Normal rate.      Pulses: Normal pulses.   Pulmonary:      Effort: Pulmonary effort is normal.   Abdominal:      General: There is no distension.   Musculoskeletal:      Cervical back: Normal range of motion. No rigidity.      Comments: Right knee on examination today reveals medial joint line tenderness with palpation.  Patient exhibits no significant laxity upon varus/valgus stress.  Lachman and drawer testing negative.  Neurovascular status grossly intact right lower extremity.    Right elbow examination today reveals mild generalized swelling with tenderness to palpation along the lateral epicondyle worse upon oppositional extension as well as pronation/supination.  There is no significant obvious ecchymosis with full range of motion.  Neurovascular status grossly intact right upper extremity     Skin:     General: Skin is warm and dry.      Capillary Refill: Capillary refill takes less than 2 seconds.   Neurological:      General: No focal deficit present.      Mental Status: He is alert and oriented to person, place, and time.   Psychiatric:         Mood and Affect: Mood normal.         Behavior: Behavior normal.                 Radiology:      XR Elbow 3+ View Right    Result Date: 3/21/2024    No acute findings in the right elbow.   This report was finalized on 3/21/2024 9:41 AM by Dr. Marshall Gray MD.                 Assessment/Plan        ICD-10-CM ICD-9-CM   1. Primary osteoarthritis of right knee  M17.11 715.16   2. Right elbow pain  M25.521 719.42     39-year-old male with notable early mild degenerative right knee osteoarthritis and degenerative changes.  The patient has responded well with the previous injections and as result of exacerbation and return of symptoms the patient was provided today with a right knee intra-articular steroid injection with 80 mg Depo-Medrol and lidocaine block injected  into the intra-articular space of the right knee.  Patient tolerated this procedure well.  In reference to the right elbow pain suffering a fall in which the patient has undergone traumatic onset of lateral epicondylitis.  The patient will continue to implement NSAIDs as well as stretching and range of motion modalities as deemed appropriate.  We will continue to monitor for any significant changes.  Patient was instructed to return back in 3 weeks for further evaluation of the efficacy of the conservative treatment.  -year-old male with notable right knee osteoarthritis and degenerative changes.  The patient has responded well with the previous intra-articular steroid injection.  As result of this the patient will continue to progress his activity and will return back on an as-needed basis upon any further complication or worsening of pain/symptoms.  No direct surgical indication.    Large Joint Arthrocentesis: R knee  Date/Time: 3/21/2024 10:12 AM  Consent given by: patient  Site marked: site marked  Supporting Documentation  Indications: pain and diagnostic evaluation   Procedure Details  Location: knee - R knee  Needle size: 25 G  Approach: anterolateral  Medications administered: 80 mg methylPREDNISolone acetate 80 MG/ML; 5 mL lidocaine PF 1% 1 %  Patient tolerance: patient tolerated the procedure well with no immediate complications                        This document was signed by Merrill Aviles PA-C March 21, 2024  CC: Gabino Harrison PA      Dictated Utilizing Dragon Dictation:   Please note that portions of this note were completed with a voice recognition program.   Part of this note may be an electronic transcription/translation of spoken language to printed text using the Dragon Dictation System.

## 2024-03-28 ENCOUNTER — DISEASE STATE MANAGEMENT VISIT (OUTPATIENT)
Dept: PHARMACY | Facility: HOSPITAL | Age: 39
End: 2024-03-28
Payer: COMMERCIAL

## 2024-03-28 VITALS
BODY MASS INDEX: 50.62 KG/M2 | HEIGHT: 66 IN | WEIGHT: 315 LBS | HEART RATE: 70 BPM | DIASTOLIC BLOOD PRESSURE: 81 MMHG | SYSTOLIC BLOOD PRESSURE: 143 MMHG

## 2024-03-28 DIAGNOSIS — E66.01 CLASS 3 SEVERE OBESITY DUE TO EXCESS CALORIES WITHOUT SERIOUS COMORBIDITY WITH BODY MASS INDEX (BMI) OF 60.0 TO 69.9 IN ADULT: Primary | ICD-10-CM

## 2024-03-28 RX ORDER — SEMAGLUTIDE 1.7 MG/.75ML
1.7 INJECTION, SOLUTION SUBCUTANEOUS WEEKLY
Qty: 3 ML | Refills: 0 | Status: SHIPPED | OUTPATIENT
Start: 2024-03-28

## 2024-03-28 NOTE — PROGRESS NOTES
[facility]  Weight Management Program      Emmett Valladares is a 39 y.o. male referred to the [facility] by Gabino Harrison for clinical pharmacy and specialty pharmacy management of GLP1 for weight management. The patient denies a personal history or family history of thyroid cancer and denies a personal history of pancreatitis.     Emmett Valladares has previously tried Adipex and Mounjaro, and Rybelsus and lifestyle changes for weight loss.   Current weight loss efforts include lifestyle changes (has cut out soda and limiting carbs by focusing on protein).     Patient currently on Wegovy 1 mg weekly. Denies lumps/swelling/hoarseness, abdominal pain. Reports some nausea on the first day of the injection. Not currently meal prepping, as he mainly only eats in the morning and snacks throughout the day. 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 Wants to titrate up his dose today. Report drinking about 15 bottles of water per day. Has drank maybe 2 pops since his last visit.    Patient states his goal weight is 225 lbs.    Relevant Past Medical History and Co-morbidities  Past Medical History:   Diagnosis Date    Sleep apnea      Social History     Socioeconomic History    Marital status:    Tobacco Use    Smoking status: Former     Types: Cigarettes    Smokeless tobacco: Never   Vaping Use    Vaping status: Never Used   Substance and Sexual Activity    Alcohol use: Yes     Comment: occasional    Drug use: Never    Sexual activity: Defer       Allergies  Patient has no known allergies.    Current Medication List    Current Outpatient Medications:     ibuprofen (ADVIL,MOTRIN) 800 MG tablet, Take 1 tablet by mouth Every 8 (Eight) Hours As Needed for Mild Pain., Disp: , Rfl:     Semaglutide-Weight Management (Wegovy) 1 MG/0.5ML solution auto-injector, Inject 0.5 mL under the skin into the appropriate area as directed 1 (One) Time  "Per Week., Disp: 2 mL, Rfl: 0    Drug Interactions  No drug interactions expected with Wegovy according to current literature.     Relevant Laboratory Values  Lab Results   Component Value Date    CHOL 143 02/01/2024    TRIG 48 02/01/2024    HDL 47 02/01/2024    LDL 85 02/01/2024     Body mass index is 58.11 kg/m².    Vaccinations:   Patient recommended to keep up with routine vaccinations.     Goals of Therapy  Clinical Goals or Therapeutic Targets: Prevent weight associated co-morbidities and complications      Date   02/01/24   2/29/24 3/28/24     Weight (lb) 380 lb 369.2 lb 360     BMI kg/ 61.36 59.62 58.11     Waist Circumference (in)  60.5\" 57\" 59.75\"         Medication Assessment & Plan    Patient has current BMI of 58.11, which is considered Class III Obesity. Patient has lost 20 lb total since the beginning.     Labs were ordered today. Please remind patient to complete so we may monitor appropriateness.    Will order Wegovy 1.7 mg SC weekly.      The following medications will need dose adjustments/closer monitoring once the GLP1 is started: None    Discussed lifestyle modifications, including diet and exercise.  Patient education provided.     One of the BP checks included a systolic reading of >170. BP on the recheck was 158/107, HR 68. Made recommendation for ER visit with BP being elevated and patient declined. I discussed the signs and symptoms of high blood pressure and that he was at increased risk of stroke and heart attack. He stated that he needs to get back to work and that he feels fine at the moment and if he feels bad he will go to the ER. I stated that I will reach out to the referring provider Gabino Harrison about elevated BP and patient declined an appointment at this moment. Patients wife said that she will make an appointment at urgent care tomorrow for patient since he is off at that time. I reached out to Gabino Harrison and he stated that he has an appointment with the patient in " May, but he will reach out to patient to see if they can move up the appointment.     Worked with patient to create SMART Goal(s):   Meal prep lunches for work  Exercise (with family using exercise YouTube videos) 2 days a week for 30 minutes at time.       Will follow-up with patient in clinic in 4 weeks.       Aparna Farrell, PharmD  3/28/2024  08:33 EDT

## 2024-04-14 RX ORDER — LIDOCAINE HYDROCHLORIDE 10 MG/ML
5 INJECTION, SOLUTION EPIDURAL; INFILTRATION; INTRACAUDAL; PERINEURAL
Status: COMPLETED | OUTPATIENT
Start: 2024-03-21 | End: 2024-03-21

## 2024-04-14 RX ORDER — METHYLPREDNISOLONE ACETATE 80 MG/ML
80 INJECTION, SUSPENSION INTRA-ARTICULAR; INTRALESIONAL; INTRAMUSCULAR; SOFT TISSUE
Status: COMPLETED | OUTPATIENT
Start: 2024-03-21 | End: 2024-03-21

## 2024-04-25 ENCOUNTER — DISEASE STATE MANAGEMENT VISIT (OUTPATIENT)
Dept: PHARMACY | Facility: HOSPITAL | Age: 39
End: 2024-04-25
Payer: COMMERCIAL

## 2024-04-25 VITALS
SYSTOLIC BLOOD PRESSURE: 164 MMHG | BODY MASS INDEX: 50.62 KG/M2 | WEIGHT: 315 LBS | HEART RATE: 56 BPM | DIASTOLIC BLOOD PRESSURE: 109 MMHG | HEIGHT: 66 IN

## 2024-04-25 RX ORDER — SEMAGLUTIDE 2.4 MG/.75ML
2.4 INJECTION, SOLUTION SUBCUTANEOUS WEEKLY
Qty: 3 ML | Refills: 0 | Status: SHIPPED | OUTPATIENT
Start: 2024-04-25

## 2024-04-25 NOTE — PROGRESS NOTES
[facility]  Weight Management Program      Emmett Valladares is a 39 y.o. male referred to the [facility] by Gabino Harrison for clinical pharmacy and specialty pharmacy management of GLP1 for weight management. The patient denies a personal history or family history of thyroid cancer and denies a personal history of pancreatitis.     Emmett Valladares has previously tried Adipex and Mounjaro, and Rybelsus and lifestyle changes for weight loss.   Current weight loss efforts include lifestyle changes (has cut out soda and limiting carbs by focusing on protein).     Patient currently on Wegovy 1.7  mg weekly. Denies lumps/swelling/hoarseness, abdominal pain. Patient reports tolerating well and denies any side effects. 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 Meal prep is not easy and he hasn't been doing well with that. Patient reports staying well hydrated.    Patient states his goal weight is 225 lbs.    Relevant Past Medical History and Co-morbidities  Past Medical History:   Diagnosis Date    Sleep apnea      Social History     Socioeconomic History    Marital status:    Tobacco Use    Smoking status: Former     Types: Cigarettes    Smokeless tobacco: Never   Vaping Use    Vaping status: Never Used   Substance and Sexual Activity    Alcohol use: Yes     Comment: occasional    Drug use: Never    Sexual activity: Defer       Allergies  Patient has no known allergies.    Current Medication List    Current Outpatient Medications:     ibuprofen (ADVIL,MOTRIN) 800 MG tablet, Take 1 tablet by mouth Every 8 (Eight) Hours As Needed for Mild Pain., Disp: , Rfl:     Semaglutide-Weight Management (Wegovy) 1.7 MG/0.75ML solution auto-injector, Inject 0.75 mL under the skin into the appropriate area as directed 1 (One) Time Per Week., Disp: 3 mL, Rfl: 0    Drug Interactions  No drug interactions expected with  "Wegovy according to current literature.     Relevant Laboratory Values  Lab Results   Component Value Date    CHOL 143 02/01/2024    TRIG 48 02/01/2024    HDL 47 02/01/2024    LDL 85 02/01/2024     There is no height or weight on file to calculate BMI.    Vaccinations:   Patient recommended to keep up with routine vaccinations.     Goals of Therapy  Clinical Goals or Therapeutic Targets: Prevent weight associated co-morbidities and complications      Date   02/01/24   2/29/24   3/28/24   4/24/24    Weight (lb) 380 lb 369.2 lb 360 351.8 lb    BMI kg/ 61.36 59.62 58.11 56.78    Waist Circumference (in)  60.5\" 57\" 59.75\" 56\"        Medication Assessment & Plan    Patient has current BMI of 58.11, which is considered Class III Obesity. Patient has lost 28.2 lb total since the beginning.  Congratulated on success thus far!    Will order Wegovy 2.4 mg SC weekly.      The following medications will need dose adjustments/closer monitoring once the GLP1 is started: None    Patient reported today that he was unaware of lab work that he needed to complete. He is aware and reported he will complete in the next day or two and understands these need to be completed prior to next clinic visit in 4 weeks.     Discussed lifestyle modifications, including diet and exercise.  Patient education provided.     Pt BP remains elevated when he comes to clinic. Recommendation for ED evaluation has remained and even advised to call PCP to discuss as patient doesn't take any BP medications. Made recommendation for ER visit with BP being elevated and patient declined. Patient is aware of risks involved with elevated BP readings including but not limited to, stroke, heart attack,etc. Per chart notes, patient PCP was contacted on last clinic visit and made aware of elevated BP.     Worked with patient to create SMART Goal(s):   Meal prep lunches for work  Exercise (with family using exercise YouTube videos) 2 days a week for 30 minutes at time. "       Will follow-up with patient in clinic in 4 weeks.       Leidy Rhodes. Nawaf, PharmD  4/25/2024  08:23 EDT

## 2024-05-01 ENCOUNTER — APPOINTMENT (OUTPATIENT)
Dept: GENERAL RADIOLOGY | Facility: HOSPITAL | Age: 39
End: 2024-05-01
Payer: COMMERCIAL

## 2024-05-01 ENCOUNTER — HOSPITAL ENCOUNTER (EMERGENCY)
Facility: HOSPITAL | Age: 39
Discharge: HOME | End: 2024-05-01
Attending: EMERGENCY MEDICINE
Payer: COMMERCIAL

## 2024-05-01 VITALS
DIASTOLIC BLOOD PRESSURE: 84 MMHG | TEMPERATURE: 98.2 F | WEIGHT: 315 LBS | SYSTOLIC BLOOD PRESSURE: 130 MMHG | HEIGHT: 66 IN | BODY MASS INDEX: 50.62 KG/M2 | OXYGEN SATURATION: 99 % | RESPIRATION RATE: 18 BRPM | HEART RATE: 73 BPM

## 2024-05-01 DIAGNOSIS — R07.9 NONSPECIFIC CHEST PAIN: Primary | ICD-10-CM

## 2024-05-01 LAB
ALBUMIN SERPL-MCNC: 4.1 G/DL (ref 3.5–5.2)
ALBUMIN/GLOB SERPL: 1.4 G/DL
ALP SERPL-CCNC: 90 U/L (ref 39–117)
ALT SERPL W P-5'-P-CCNC: 23 U/L (ref 1–41)
ANION GAP SERPL CALCULATED.3IONS-SCNC: 11.6 MMOL/L (ref 5–15)
AST SERPL-CCNC: 17 U/L (ref 1–40)
BASOPHILS # BLD AUTO: 0.06 10*3/MM3 (ref 0–0.2)
BASOPHILS NFR BLD AUTO: 0.6 % (ref 0–1.5)
BILIRUB SERPL-MCNC: 0.3 MG/DL (ref 0–1.2)
BUN SERPL-MCNC: 16 MG/DL (ref 6–20)
BUN/CREAT SERPL: 18.8 (ref 7–25)
CALCIUM SPEC-SCNC: 9.4 MG/DL (ref 8.6–10.5)
CHLORIDE SERPL-SCNC: 103 MMOL/L (ref 98–107)
CO2 SERPL-SCNC: 26.4 MMOL/L (ref 22–29)
CREAT SERPL-MCNC: 0.85 MG/DL (ref 0.76–1.27)
DEPRECATED RDW RBC AUTO: 46.1 FL (ref 37–54)
EGFRCR SERPLBLD CKD-EPI 2021: 113.4 ML/MIN/1.73
EOSINOPHIL # BLD AUTO: 0.21 10*3/MM3 (ref 0–0.4)
EOSINOPHIL NFR BLD AUTO: 1.9 % (ref 0.3–6.2)
ERYTHROCYTE [DISTWIDTH] IN BLOOD BY AUTOMATED COUNT: 13.7 % (ref 12.3–15.4)
GEN 5 2HR TROPONIN T REFLEX: <6 NG/L
GLOBULIN UR ELPH-MCNC: 2.9 GM/DL
GLUCOSE SERPL-MCNC: 80 MG/DL (ref 65–99)
HCT VFR BLD AUTO: 44.2 % (ref 37.5–51)
HGB BLD-MCNC: 14.3 G/DL (ref 13–17.7)
HOLD SPECIMEN: NORMAL
HOLD SPECIMEN: NORMAL
IMM GRANULOCYTES # BLD AUTO: 0.03 10*3/MM3 (ref 0–0.05)
IMM GRANULOCYTES NFR BLD AUTO: 0.3 % (ref 0–0.5)
LYMPHOCYTES # BLD AUTO: 3.2 10*3/MM3 (ref 0.7–3.1)
LYMPHOCYTES NFR BLD AUTO: 29.4 % (ref 19.6–45.3)
MCH RBC QN AUTO: 29.5 PG (ref 26.6–33)
MCHC RBC AUTO-ENTMCNC: 32.4 G/DL (ref 31.5–35.7)
MCV RBC AUTO: 91.1 FL (ref 79–97)
MONOCYTES # BLD AUTO: 1.05 10*3/MM3 (ref 0.1–0.9)
MONOCYTES NFR BLD AUTO: 9.7 % (ref 5–12)
NEUTROPHILS NFR BLD AUTO: 58.1 % (ref 42.7–76)
NEUTROPHILS NFR BLD AUTO: 6.33 10*3/MM3 (ref 1.7–7)
NRBC BLD AUTO-RTO: 0 /100 WBC (ref 0–0.2)
PLATELET # BLD AUTO: 301 10*3/MM3 (ref 140–450)
PMV BLD AUTO: 10 FL (ref 6–12)
POTASSIUM SERPL-SCNC: 4.3 MMOL/L (ref 3.5–5.2)
PROT SERPL-MCNC: 7 G/DL (ref 6–8.5)
QT INTERVAL: 362 MS
QTC INTERVAL: 387 MS
RBC # BLD AUTO: 4.85 10*6/MM3 (ref 4.14–5.8)
SODIUM SERPL-SCNC: 141 MMOL/L (ref 136–145)
TROPONIN T DELTA: NORMAL
TROPONIN T SERPL HS-MCNC: <6 NG/L
WBC NRBC COR # BLD AUTO: 10.88 10*3/MM3 (ref 3.4–10.8)
WHOLE BLOOD HOLD COAG: NORMAL
WHOLE BLOOD HOLD SPECIMEN: NORMAL

## 2024-05-01 PROCEDURE — 93005 ELECTROCARDIOGRAM TRACING: CPT | Performed by: EMERGENCY MEDICINE

## 2024-05-01 PROCEDURE — 71045 X-RAY EXAM CHEST 1 VIEW: CPT | Performed by: RADIOLOGY

## 2024-05-01 PROCEDURE — 85025 COMPLETE CBC W/AUTO DIFF WBC: CPT | Performed by: EMERGENCY MEDICINE

## 2024-05-01 PROCEDURE — 99284 EMERGENCY DEPT VISIT MOD MDM: CPT

## 2024-05-01 PROCEDURE — 84484 ASSAY OF TROPONIN QUANT: CPT | Performed by: EMERGENCY MEDICINE

## 2024-05-01 PROCEDURE — 80053 COMPREHEN METABOLIC PANEL: CPT | Performed by: EMERGENCY MEDICINE

## 2024-05-01 PROCEDURE — 71045 X-RAY EXAM CHEST 1 VIEW: CPT

## 2024-05-01 PROCEDURE — 36415 COLL VENOUS BLD VENIPUNCTURE: CPT

## 2024-05-01 PROCEDURE — 93010 ELECTROCARDIOGRAM REPORT: CPT | Performed by: INTERNAL MEDICINE

## 2024-05-01 RX ORDER — SODIUM CHLORIDE 0.9 % (FLUSH) 0.9 %
10 SYRINGE (ML) INJECTION AS NEEDED
Status: DISCONTINUED | OUTPATIENT
Start: 2024-05-01 | End: 2024-05-01 | Stop reason: HOSPADM

## 2024-05-01 RX ORDER — ASPIRIN 325 MG
325 TABLET ORAL ONCE
Status: COMPLETED | OUTPATIENT
Start: 2024-05-01 | End: 2024-05-01

## 2024-05-01 RX ADMIN — ASPIRIN 325 MG: 325 TABLET ORAL at 13:44

## 2024-05-02 ENCOUNTER — TRANSCRIBE ORDERS (OUTPATIENT)
Dept: ADMINISTRATIVE | Facility: HOSPITAL | Age: 39
End: 2024-05-02
Payer: COMMERCIAL

## 2024-05-02 DIAGNOSIS — R07.9 CHEST PAIN, UNSPECIFIED TYPE: Primary | ICD-10-CM

## 2024-05-06 NOTE — ED PROVIDER NOTES
Subjective     History provided by:  Patient   used: No    Chest Pain  Pain location:  Substernal area  Pain quality: aching and dull    Pain radiates to:  Does not radiate  Pain severity:  Mild  Onset quality:  Gradual  Duration:  2 weeks  Timing:  Constant  Progression:  Worsening  Chronicity:  Chronic  Context: not breathing, not drug use, not eating, not intercourse, not lifting, not movement, not raising an arm, not at rest, not stress and not trauma    Relieved by:  Nothing  Worsened by:  Nothing  Ineffective treatments:  None tried  Associated symptoms: no abdominal pain, no AICD problem, no altered mental status, no anorexia, no anxiety, no back pain, no claudication, no cough, no diaphoresis, no dizziness, no dysphagia, no fatigue, no fever, no headache, no heartburn, no lower extremity edema, no nausea, no near-syncope, no numbness, no orthopnea, no palpitations, no PND, no shortness of breath, no syncope, no vomiting and no weakness        Review of Systems   Constitutional:  Negative for activity change, appetite change, chills, diaphoresis, fatigue and fever.   HENT:  Negative for congestion, ear pain, sore throat and trouble swallowing.    Eyes:  Negative for redness.   Respiratory:  Negative for cough, chest tightness, shortness of breath and wheezing.    Cardiovascular:  Positive for chest pain. Negative for palpitations, orthopnea, claudication, leg swelling, syncope, PND and near-syncope.   Gastrointestinal:  Negative for abdominal pain, anorexia, diarrhea, heartburn, nausea and vomiting.   Genitourinary:  Negative for dysuria and urgency.   Musculoskeletal:  Negative for arthralgias, back pain, myalgias and neck pain.   Skin:  Negative for pallor, rash and wound.   Neurological:  Negative for dizziness, speech difficulty, weakness, numbness and headaches.   Psychiatric/Behavioral:  Negative for agitation, behavioral problems, confusion and decreased concentration.    All other  systems reviewed and are negative.      Past Medical History:   Diagnosis Date    Sleep apnea        No Known Allergies    No past surgical history on file.    Family History   Problem Relation Age of Onset    Gout Mother     Cancer Maternal Grandfather        Social History     Socioeconomic History    Marital status:    Tobacco Use    Smoking status: Former     Types: Cigarettes    Smokeless tobacco: Never   Vaping Use    Vaping status: Never Used   Substance and Sexual Activity    Alcohol use: Yes     Comment: occasional    Drug use: Never    Sexual activity: Defer           Objective   Physical Exam  Vitals and nursing note reviewed.   Constitutional:       General: He is not in acute distress.     Appearance: Normal appearance. He is well-developed. He is not toxic-appearing or diaphoretic.   HENT:      Head: Normocephalic and atraumatic.      Right Ear: External ear normal.      Left Ear: External ear normal.      Nose: Nose normal.      Mouth/Throat:      Pharynx: No oropharyngeal exudate.      Tonsils: No tonsillar exudate.   Eyes:      General: Lids are normal.      Conjunctiva/sclera: Conjunctivae normal.      Pupils: Pupils are equal, round, and reactive to light.   Neck:      Thyroid: No thyromegaly.   Cardiovascular:      Rate and Rhythm: Normal rate and regular rhythm.      Pulses: Normal pulses.      Heart sounds: Normal heart sounds, S1 normal and S2 normal.   Pulmonary:      Effort: Pulmonary effort is normal. No tachypnea or respiratory distress.      Breath sounds: Normal breath sounds. No decreased breath sounds, wheezing or rales.   Chest:      Chest wall: No tenderness.   Abdominal:      General: Bowel sounds are normal. There is no distension.      Palpations: Abdomen is soft.      Tenderness: There is no abdominal tenderness. There is no guarding or rebound.   Musculoskeletal:         General: No tenderness or deformity. Normal range of motion.      Cervical back: Full passive range of  motion without pain, normal range of motion and neck supple.   Lymphadenopathy:      Cervical: No cervical adenopathy.   Skin:     General: Skin is warm and dry.      Coloration: Skin is not pale.      Findings: No erythema or rash.   Neurological:      Mental Status: He is alert and oriented to person, place, and time.      GCS: GCS eye subscore is 4. GCS verbal subscore is 5. GCS motor subscore is 6.      Cranial Nerves: No cranial nerve deficit.      Sensory: No sensory deficit.   Psychiatric:         Speech: Speech normal.         Behavior: Behavior normal.         Thought Content: Thought content normal.         Judgment: Judgment normal.         Procedures           ED Course  ED Course as of 05/06/24 0942   Wed May 01, 2024   1334 ECG 12 Lead ED Triage Standing Order; Chest Pain  Normal sinus rhythm  Normal EKG [ES]   1430 XR Chest 1 View     IMPRESSION:    Unremarkable exam. No acute cardiopulmonary findings identified.   [ES]      ED Course User Index  [ES] Alireza Tobar MD                HEART Score: 1                              Medical Decision Making  Problems Addressed:  Nonspecific chest pain: complicated acute illness or injury    Amount and/or Complexity of Data Reviewed  Labs: ordered.  Radiology: ordered. Decision-making details documented in ED Course.  ECG/medicine tests: ordered. Decision-making details documented in ED Course.    Risk  OTC drugs.        Final diagnoses:   Nonspecific chest pain       ED Disposition  ED Disposition       ED Disposition   Discharge    Condition   Stable    Comment   --               Carla Phillips MD  [address] [place] [id number]  [phone]    Schedule an appointment as soon as possible for a visit in 1 day  EVALUATE         Medication List      No changes were made to your prescriptions during this visit.            Alireza Tobar MD  05/06/24 0943

## 2024-05-23 ENCOUNTER — DISEASE STATE MANAGEMENT VISIT (OUTPATIENT)
Dept: PHARMACY | Facility: HOSPITAL | Age: 39
End: 2024-05-23
Payer: COMMERCIAL

## 2024-05-23 VITALS
BODY MASS INDEX: 50.62 KG/M2 | HEART RATE: 82 BPM | HEIGHT: 66 IN | SYSTOLIC BLOOD PRESSURE: 139 MMHG | DIASTOLIC BLOOD PRESSURE: 89 MMHG | WEIGHT: 315 LBS

## 2024-05-23 RX ORDER — LOSARTAN POTASSIUM 25 MG/1
1 TABLET ORAL DAILY
COMMUNITY
Start: 2024-05-16

## 2024-05-23 RX ORDER — TRAZODONE HYDROCHLORIDE 50 MG/1
50 TABLET ORAL NIGHTLY PRN
COMMUNITY
Start: 2024-05-17

## 2024-05-23 RX ORDER — SEMAGLUTIDE 2.4 MG/.75ML
2.4 INJECTION, SOLUTION SUBCUTANEOUS WEEKLY
Qty: 3 ML | Refills: 0 | Status: SHIPPED | OUTPATIENT
Start: 2024-05-23

## 2024-05-23 NOTE — PROGRESS NOTES
[facility]  Weight Management Program      Emmett Valladares is a 39 y.o. male referred to the [facility] by Gabino Harrison for clinical pharmacy and specialty pharmacy management of GLP1 for weight management. The patient denies a personal history or family history of thyroid cancer and denies a personal history of pancreatitis.     Emmett Valladares has previously tried Adipex and Mounjaro, and Rybelsus and lifestyle changes for weight loss.   Current weight loss efforts include lifestyle changes (has cut out soda and limiting carbs by focusing on protein).     Patient currently on Wegovy 2.4  mg weekly. Denies lumps/swelling/hoarseness, abdominal pain. Patient reports tolerating well and denies any side effects. He does report that he is more constipated right now and that this isn't uncommon for him even before starting the medication. The patient reports that he has not taken anything to help with this.  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 Meal prep is not easy and he hasn't been doing well with that. Patient reports staying well hydrated.    Patient reports that he was started on Losartan last Thursday. Patient does report that his adherence to it has not been good thus far.  Patient had ED visit on 5/1 for CP. Patient reports that he was told absolutely nothing and saw no signs of anything but did schedule him for a stress test on June 6th. Patient denies any CP since that time and reports he feels he was over stressed and exhausted which led to that.     Patient states his goal weight is 225 lbs.    Relevant Past Medical History and Co-morbidities  Past Medical History:   Diagnosis Date    Sleep apnea      Social History     Socioeconomic History    Marital status:    Tobacco Use    Smoking status: Former     Types: Cigarettes    Smokeless tobacco: Never   Vaping Use    Vaping status:  "Never Used   Substance and Sexual Activity    Alcohol use: Yes     Comment: occasional    Drug use: Never    Sexual activity: Defer       Allergies  Patient has no known allergies.    Current Medication List    Current Outpatient Medications:     ibuprofen (ADVIL,MOTRIN) 800 MG tablet, Take 1 tablet by mouth Every 8 (Eight) Hours As Needed for Mild Pain., Disp: , Rfl:     Semaglutide-Weight Management (Wegovy) 2.4 MG/0.75ML solution auto-injector, Inject 2.4 mg under the skin into the appropriate area as directed 1 (One) Time Per Week., Disp: 3 mL, Rfl: 0  No current facility-administered medications for this visit.    Drug Interactions  No drug interactions expected with Wegovy according to current literature.     Relevant Laboratory Values  Lab Results   Component Value Date    CHOL 143 02/01/2024    TRIG 48 02/01/2024    HDL 47 02/01/2024    LDL 85 02/01/2024     There is no height or weight on file to calculate BMI.    Vaccinations:   Patient recommended to keep up with routine vaccinations.     Goals of Therapy  Clinical Goals or Therapeutic Targets: Prevent weight associated co-morbidities and complications      Date   02/01/24   2/29/24   3/28/24   4/24/24   5/23/24   Weight (lb) 380 lb 369.2 lb 360 351.8 lb 352 lb   BMI kg/ 61.36 59.62 58.11 56.78 56.81   Waist Circumference (in)  60.5\" 57\" 59.75\" 56\" 58.75\"       Medication Assessment & Plan    Patient has current BMI of 56.81, which is considered Class III Obesity. Patient has lost 28.2 lb total since the beginning.  Congratulated on success thus far! Patient did not lose weight this visit.     Will order Wegovy 2.4 mg SC weekly.      The following medications will need dose adjustments/closer monitoring once the GLP1 is started: None    Patient still did not complete lab work. Patient will need to complete prior to next refill being sent in. Encourage patient to go the morning of next appt if he has not already and is fasting.     Pts BP was improved " today from previous visits. Patient has been started on Losartan, last week per his reports, however he did admit to missing several doses. Encouraged routine taking of medication and close monitoring of BP while he initiates therapy.     Discussed lifestyle modifications, including diet and exercise.  Patient education provided.     Worked with patient to create SMART Goal(s):   Meal prep lunches for work  Exercise (with family using exercise YouTube videos) 2 days a week for 30 minutes at time.       Will follow-up with patient in clinic in 4 weeks.       Leidy Rhodes. Nawaf, PharmD  5/23/2024  08:42 EDT

## 2024-06-06 ENCOUNTER — OFFICE VISIT (OUTPATIENT)
Dept: CARDIOLOGY | Facility: CLINIC | Age: 39
End: 2024-06-06
Payer: COMMERCIAL

## 2024-06-06 ENCOUNTER — PATIENT ROUNDING (BHMG ONLY) (OUTPATIENT)
Dept: CARDIOLOGY | Facility: CLINIC | Age: 39
End: 2024-06-06
Payer: COMMERCIAL

## 2024-06-06 VITALS
DIASTOLIC BLOOD PRESSURE: 83 MMHG | WEIGHT: 315 LBS | HEART RATE: 74 BPM | HEIGHT: 66 IN | SYSTOLIC BLOOD PRESSURE: 139 MMHG | BODY MASS INDEX: 50.62 KG/M2 | OXYGEN SATURATION: 94 %

## 2024-06-06 DIAGNOSIS — R73.03 PREDIABETES: ICD-10-CM

## 2024-06-06 DIAGNOSIS — G47.33 OSA ON CPAP: ICD-10-CM

## 2024-06-06 DIAGNOSIS — E66.01 CLASS 3 SEVERE OBESITY DUE TO EXCESS CALORIES WITHOUT SERIOUS COMORBIDITY WITH BODY MASS INDEX (BMI) OF 60.0 TO 69.9 IN ADULT: ICD-10-CM

## 2024-06-06 DIAGNOSIS — I10 ESSENTIAL HYPERTENSION: ICD-10-CM

## 2024-06-06 DIAGNOSIS — R07.2 PRECORDIAL CHEST PAIN: Primary | ICD-10-CM

## 2024-06-06 DIAGNOSIS — F17.211 CIGARETTE NICOTINE DEPENDENCE IN REMISSION: ICD-10-CM

## 2024-06-06 DIAGNOSIS — R06.02 SHORTNESS OF BREATH: ICD-10-CM

## 2024-06-06 PROBLEM — E78.5 DYSLIPIDEMIA: Status: RESOLVED | Noted: 2024-06-06 | Resolved: 2024-06-06

## 2024-06-06 PROBLEM — E78.5 DYSLIPIDEMIA: Status: ACTIVE | Noted: 2024-06-06

## 2024-06-06 PROCEDURE — 99204 OFFICE O/P NEW MOD 45 MIN: CPT | Performed by: SPECIALIST

## 2024-06-06 NOTE — PROGRESS NOTES
Subjective   Initial consultation, chest pain  Emmett Valladares is a 39 y.o. male who presents to day for Med Management (VERBAL/) and Establish Care (/).    CHIEF COMPLIANT  Chief Complaint   Patient presents with    Med Management     VERBAL      Establish Care              Active Problems:  Problem List Items Addressed This Visit          Cardiac and Vasculature    Precordial chest pain - Primary    Relevant Orders    Adult Transthoracic Echo Complete w/ Color, Spectral and Contrast if necessary per protocol    CT Angiogram Heart With 3D Image    Essential hypertension       Endocrine and Metabolic    Class 3 severe obesity due to excess calories with body mass index (BMI) of 60.0 to 69.9 in adult    Prediabetes       Pulmonary and Pneumonias    Shortness of breath    Relevant Orders    Adult Transthoracic Echo Complete w/ Color, Spectral and Contrast if necessary per protocol    CT Angiogram Heart With 3D Image       Sleep    JAESN on CPAP    Relevant Orders    Ambulatory Referral to Pulmonology       Tobacco    Cigarette nicotine dependence in remission       HPI  HPI  For many months now with chest pain now it happens almost daily he described this as pressure that I discharge it for a part going initially to the left side with a pleuritic element I can last any time for few seconds to many minutes and it can happen several times a day at random with no particular relationship to exertion or food she is not with diaphoresis he is also noticed or felt that his blood pressure probably is high during these episodes but he never actually checked his blood pressure during these episodes he also notes recently some lower extremity edema, used to smoke in the past he quit about 5 years ago smoked for about 10 years about a pack per day he has had not had his blood pressure sometimes a little bit elevated at the doctor's office but sometimes also is good so currently is not being diagnosed family with high blood pressure  "he is also prediabetic and he has sleep apnea's been using CPAP for at least 4 to 5 years now, otherwise family history wise he denied any cardiac family history  PRIOR MEDS  Current Outpatient Medications on File Prior to Visit   Medication Sig Dispense Refill    ibuprofen (ADVIL,MOTRIN) 800 MG tablet Take 1 tablet by mouth Every 8 (Eight) Hours As Needed for Mild Pain.      losartan (COZAAR) 25 MG tablet Take 1 tablet by mouth Daily.      Semaglutide-Weight Management (Wegovy) 2.4 MG/0.75ML solution auto-injector Inject 2.4 mg under the skin into the appropriate area as directed 1 (One) Time Per Week. 3 mL 0    traZODone (DESYREL) 50 MG tablet Take 1 tablet by mouth At Night As Needed for Sleep.       No current facility-administered medications on file prior to visit.       ALLERGIES  Patient has no known allergies.    HISTORY  Past Medical History:   Diagnosis Date    Sleep apnea        Social History     Socioeconomic History    Marital status:    Tobacco Use    Smoking status: Former     Types: Cigarettes    Smokeless tobacco: Never   Vaping Use    Vaping status: Never Used   Substance and Sexual Activity    Alcohol use: Yes     Comment: occasional    Drug use: Never    Sexual activity: Defer       Family History   Problem Relation Age of Onset    Gout Mother     Cancer Maternal Grandfather        Review of Systems   Respiratory:  Positive for chest tightness and shortness of breath. Negative for apnea, cough, choking, wheezing and stridor.    Cardiovascular:  Positive for leg swelling. Negative for chest pain and palpitations.       Objective     VITALS: /83 (BP Location: Left arm, Patient Position: Sitting, Cuff Size: Adult)   Pulse 74   Ht 167.6 cm (65.98\")   Wt (!) 163 kg (360 lb)   SpO2 94%   BMI 58.13 kg/m²     LABS:   Lab Results (most recent)       None            IMAGING:   XR Chest 1 View    Result Date: 5/1/2024    Unremarkable exam. No acute cardiopulmonary findings identified.   " This report was finalized on 5/1/2024 2:14 PM by Dr. Marshall Gray MD.      XR Elbow 3+ View Right    Result Date: 3/21/2024    No acute findings in the right elbow.   This report was finalized on 3/21/2024 9:41 AM by Dr. Marshall Gray MD.        EXAM:  Physical Exam  Constitutional:       Appearance: He is well-developed.   HENT:      Head: Normocephalic and atraumatic.   Eyes:      Pupils: Pupils are equal, round, and reactive to light.   Neck:      Thyroid: No thyromegaly.      Vascular: No JVD.   Cardiovascular:      Rate and Rhythm: Normal rate and regular rhythm.      Chest Wall: PMI is not displaced.      Pulses: Normal pulses.      Heart sounds: Normal heart sounds, S1 normal and S2 normal. No murmur heard.     No friction rub. No gallop.   Pulmonary:      Effort: Pulmonary effort is normal. No respiratory distress.      Breath sounds: Normal breath sounds. No stridor. No wheezing or rales.   Chest:      Chest wall: No tenderness.   Abdominal:      General: Bowel sounds are normal. There is no distension.      Palpations: Abdomen is soft. There is no mass.      Tenderness: There is no abdominal tenderness. There is no guarding or rebound.   Musculoskeletal:      Cervical back: Neck supple. No edema.   Skin:     General: Skin is warm and dry.      Coloration: Skin is not pale.      Findings: No erythema or rash.   Neurological:      Mental Status: He is alert and oriented to person, place, and time.      Cranial Nerves: No cranial nerve deficit.      Coordination: Coordination normal.   Psychiatric:         Behavior: Behavior normal.         Procedure   Procedures        Assessment & Plan     Diagnoses and all orders for this visit:    1. Precordial chest pain (Primary)  -     Adult Transthoracic Echo Complete w/ Color, Spectral and Contrast if necessary per protocol; Future  -     CT Angiogram Heart With 3D Image; Future    2. Shortness of breath  -     Adult Transthoracic Echo Complete w/ Color, Spectral  and Contrast if necessary per protocol; Future  -     CT Angiogram Heart With 3D Image; Future    3. Essential hypertension    4. JASEN on CPAP  -     Ambulatory Referral to Pulmonology    5. Class 3 severe obesity due to excess calories without serious comorbidity with body mass index (BMI) of 60.0 to 69.9 in adult    6. Cigarette nicotine dependence in remission    7. Prediabetes    1.  Chest pain was typical and atypical features he has multiple risk factors for coronary artery disease we will proceed with CTA of the heart for assessment of coronary anatomy and for also calcifications  2.  Will get an echocardiac to assess cardiac function wall motion and valve morphology and chamber size  3.  His blood pressure is borderline will monitor for now  4.  He has been using CPAP religiously we will send him to pulmonology for follow-up on the CPAP machine  5.  He quit his smoking about 5 years ago  6.  I reviewed the labs with good lipid profile      Return in about 4 weeks (around 7/4/2024).               MEDS ORDERED DURING VISIT:  No orders of the defined types were placed in this encounter.      As always, Gabino Harrison PA  I appreciate very much the opportunity to participate in the cardiovascular care of your patients. Please do not hesitate to call me with any questions with regards to Emmett Valladares evaluation and management.         This document has been electronically signed by Dennis Kim MD  June 6, 2024 10:22 EDT    This note is dictated utilizing voice recognition software.

## 2024-06-06 NOTE — PROGRESS NOTES
A [employer] message has been sent to the patient for patient rounding for [facility].

## 2024-11-07 ENCOUNTER — OFFICE VISIT (OUTPATIENT)
Dept: BEHAVIORAL HEALTH | Facility: CLINIC | Age: 39
End: 2024-11-07
Payer: COMMERCIAL

## 2024-11-07 ENCOUNTER — OFFICE VISIT (OUTPATIENT)
Dept: BARIATRICS/WEIGHT MGMT | Facility: CLINIC | Age: 39
End: 2024-11-07
Payer: COMMERCIAL

## 2024-11-07 ENCOUNTER — DOCUMENTATION (OUTPATIENT)
Dept: BARIATRICS/WEIGHT MGMT | Facility: CLINIC | Age: 39
End: 2024-11-07
Payer: COMMERCIAL

## 2024-11-07 VITALS
TEMPERATURE: 98.2 F | OXYGEN SATURATION: 96 % | SYSTOLIC BLOOD PRESSURE: 132 MMHG | BODY MASS INDEX: 50.62 KG/M2 | HEIGHT: 66 IN | DIASTOLIC BLOOD PRESSURE: 80 MMHG | HEART RATE: 71 BPM | WEIGHT: 315 LBS

## 2024-11-07 DIAGNOSIS — E66.01 MORBID OBESITY WITH BMI OF 60.0-69.9, ADULT: Primary | ICD-10-CM

## 2024-11-07 DIAGNOSIS — Z87.891 FORMER SMOKER: ICD-10-CM

## 2024-11-07 DIAGNOSIS — G47.33 OSA ON CPAP: ICD-10-CM

## 2024-11-07 DIAGNOSIS — F32.9 DEPRESSION, REACTIVE: ICD-10-CM

## 2024-11-07 DIAGNOSIS — Z71.89 ENCOUNTER FOR PSYCHOLOGICAL ASSESSMENT PRIOR TO BARIATRIC SURGERY: ICD-10-CM

## 2024-11-07 DIAGNOSIS — R53.83 FATIGUE, UNSPECIFIED TYPE: ICD-10-CM

## 2024-11-07 DIAGNOSIS — I10 ESSENTIAL HYPERTENSION: ICD-10-CM

## 2024-11-07 PROCEDURE — 90791 PSYCH DIAGNOSTIC EVALUATION: CPT | Performed by: PSYCHOLOGIST

## 2024-11-07 NOTE — PROGRESS NOTES
Bariatric Nutrition Consult     Name: Emmett Valladares   : 1985   AGE: 39 y.o.   MRN: [id number]      Consult Date: 2024     Surgery desired: sleeve    Height: 166.4cm                  Current weight: 388lbs                    BMI: 63.68    Highest weight: 400lbs                           Lowest weight: unknown    Goals: 220-225lbs        Past Medical History:   Diagnosis Date    Fatigue     Former smoker     quit 4 yrs ago    Hypertension     Insomnia     Joint pain     daily NSAIDs, prn steroid joint injections R knee ()    Prediabetes     Sleep apnea     CPAP compliant                                 Diet history reveals 2-3 meals and snacks daily, high in fat and carbs, very low in fiber, only eats corn as a vegetable, has not tried any vegetables or salad in  years and very reluctant to try. High caloric beverages    Breakfast: eggs, potatoes, biscuits, gravy, bolton, sausage/egg bolton cheese sandwich    Lunch: chicken, pork beef and potatoes    Dinner: chicken, pork, beef. Potatoes, corn, mac n cheese    Snacks: has a jar of peanuts at work and eats handfuls daily, snacks on pb report 4 teaspoons daily but has never measured it, chips    Eating out: 10 chicken nuggets and fries/ steak and baked potato    Protein sources: meat, fish, chicken, cheese, eggs    Drinks: 1/2 gallon 2% milk, orange juice, 64oz water, 1 can coke daily    Food allergies/intolerances: none    Night eating: yes-pb    Patient has/has not been diagnosed with an eating disorder: no    Exercise/activity: walks 4 miles a day at work    Main bariatric nutrition principles discussed and explained. Patient needs to focus on 100g protein daily, 100-140g carbohydrates daily, healthy fat intake, 64 oz fluid daily, no carbonation, and try protein drinks/protein powders. Avoid high fructose corn syrup. Needs to incorporate vegetables daily. Patient verbalized understanding and queries were answered.  Additional nutritional counseling  will be available      Carolin Gallardo RD,LD

## 2024-11-07 NOTE — PROGRESS NOTES
[facility] BARIATRIC SURGERY  [address]  [address]  [facility] [address]  [phone]      Patient  Name:  Emmett Valladares  :  1985      Date of Visit: 2024      Chief Complaint:  weight gain; unable to maintain weight loss      History of Present Illness:  Emmett Valladares is a 39 y.o. male who presents today for evaluation, education and consultation regarding metabolic and bariatric surgery with Dr. Michel.     Emmett has been overweight for at least 21 years, has been 35 pounds or more overweight for at least 21 years, has been 100 pounds or more overweight for 10-15 or more years.  Previous diet attempts include: High Protein; None; Ionamin/Adipex, Mounjaro/Wegovy.  The most weight Emmett lost was 50-60 pounds but was unable to maintain that weight loss.     Complete history has been obtained and discussed today, as pertinent to metabolic/ bariatric surgery.     Past Medical History:   Diagnosis Date    Fatigue     Former smoker     quit 4 yrs ago    Hypertension     Insomnia     Joint pain     daily NSAIDs, prn steroid joint injections R knee ()    Prediabetes     Sleep apnea     CPAP compliant     Past Surgical History:   Procedure Laterality Date    TONSILLECTOMY         No Known Allergies    Current Outpatient Medications:     ibuprofen (ADVIL,MOTRIN) 800 MG tablet, Take 1 tablet by mouth Every 8 (Eight) Hours As Needed for Mild Pain., Disp: , Rfl:     losartan (COZAAR) 25 MG tablet, Take 1 tablet by mouth Daily., Disp: , Rfl:     traZODone (DESYREL) 50 MG tablet, Take 1 tablet by mouth At Night As Needed for Sleep., Disp: , Rfl:     Social History     Socioeconomic History    Marital status:    Tobacco Use    Smoking status: Former     Current packs/day: 0.00     Types: Cigarettes     Quit date: 10/2020     Years since quittin.1     Passive exposure: Past    Smokeless tobacco: Never   Vaping Use    Vaping status: Never Used   Substance and  Sexual Activity    Alcohol use: Not Currently     Comment: occasional    Drug use: Never    Sexual activity: Defer     Social History     Social History Narrative    Patient lives in [place], [place]. He is  with 4 children. He works full-time as a  at [employer] in [place].        Family History   Problem Relation Age of Onset    Gout Mother     Obesity Mother     Sleep apnea Mother     Obesity Maternal Grandmother     Hypertension Maternal Grandmother     Cancer Maternal Grandfather         bone and lung  at age 59       Review of Systems:  Constitutional:  reports fatigue, weight gain.  HEENT:  denies headache, ear pain or loss of hearing, blurred or double vision, nasal discharge or sore throat.  Cardiovascular:  reports HTN and denies HLD, hx heart disease, hx DVT.  Respiratory:  reports sleep apnea and denies asthma, COPD, hx PE.  Gastrointestinal:  denies dysphagia, heartburn nausea, vomiting, abdominal pain, IBS, gallbladder issues, liver disease.  Genitourinary:  denies history of  frequent UTI, incontinence, renal insufficiency.    Musculoskeletal:  reports joint pain, back pain and denies fibromyalgia and autoimmune disease.  Neurological:  denies migraines, seizure, stroke.  Psychiatric:  denies hx depression, hx anxiety, bipolar disorder.  Endocrine: denies diabetes, thyroid disease.  Hematologic:  denies bleeding disorder, hx anemia, hx blood transfusion.  Skin:  denies hx MRSA.    Physical Exam:  Vital Signs:  Weight: (!) 176 kg (388 lb 9.6 oz)   Body mass index is 63.68 kg/m².  Temp: 98.2 °F (36.8 °C)   Heart Rate: 71   BP: 132/80     Physical Exam  Vitals reviewed.   Constitutional:       Appearance: He is well-developed.   HENT:      Head: Normocephalic and atraumatic.   Eyes:      General: No scleral icterus.     Conjunctiva/sclera: Conjunctivae normal.   Neck:      Thyroid: No thyromegaly.   Cardiovascular:      Rate and Rhythm: Normal rate and regular rhythm.      Heart  sounds: No murmur heard.  Pulmonary:      Effort: Pulmonary effort is normal. No respiratory distress.      Breath sounds: Normal breath sounds. No wheezing or rales.   Abdominal:      General: Bowel sounds are normal. There is no distension.      Palpations: Abdomen is soft. There is no mass.      Tenderness: There is no abdominal tenderness.      Hernia: No hernia is present.   Musculoskeletal:         General: Normal range of motion.      Cervical back: Neck supple.   Skin:     General: Skin is warm and dry.      Findings: No rash.   Neurological:      Mental Status: He is alert and oriented to person, place, and time.      Gait: Gait normal.   Psychiatric:         Judgment: Judgment normal.         Patient Active Problem List   Diagnosis    JASEN on CPAP    Class 3 severe obesity due to excess calories with body mass index (BMI) of 60.0 to 69.9 in adult    Precordial chest pain    Shortness of breath    Essential hypertension    Cigarette nicotine dependence in remission    Prediabetes    Joint pain    Insomnia    Fatigue    Former smoker       Assessment:  39 y.o. male with medically complicated obesity pursuing sleeve gastrectomy.    Metabolic & Bariatric Surgery is deemed medically necessary given the following: Class 3 Severe Obesity (BMI >=40). Obesity-related health conditions include the following: obstructive sleep apnea, hypertension, impaired fasting glucose, and joint pain . Obesity is worsening. BMI is is above average; BMI management plan is completed. We discussed consulting a Bariatric surgeon.          Plan:  Further evaluation will include: CBC, CMP, Lipids, TSH, HgA1C, Vitamin D, H.Pylori, and Pulmonary Function Testing.    Additional clearances needed prior to surgery will include: Cardiology.     Patient understands that bariatric surgery is not cosmetic surgery but rather a tool to help make a lifelong commitment to lifestyle changes including diet, exercise and behavior modifications.  The  patient has been educated today on those expected postoperative lifestyle changes.  Psychological and Nutritional consultations will be completed prior to surgery.  Instructions on how to access [employer] (an internet based site w/ educational surgical videos) were given to the patient.  Recommended perioperative vitamin supplementation was reviewed.  The importance of avoiding ASA/ NSAIDS/ steroids/ tobacco/nicotine/ hormones/ immunomodulators perioperatively was discussed in detail.  All questions/concerns have been addressed.      Further input to follow pending the above.           Carolyn Contreras APRN

## 2024-11-07 NOTE — PROGRESS NOTES
PROGRESS NOTE    Data:    Emmett Valladares is a 39 y.o. male who met with the undersigned for a scheduled psychological evaluation from 11:20 -12:00 pm.      Clinical Maneuvering/Intervention:      Chief complaint and history of presenting illness/Problems: struggling with obesity for several years. Despite trying different weight loss plans and diets, the pt reported being unsuccessful in losing weight. A psychological evaluation was conducted in order to assess past and current level of functioning. Areas assessed included, but were not limited to: perception of social support, perception of ability to face and deal with challenges in life (positive functioning), anxiety symptoms, depressive symptoms, perspective on beliefs/belief system, coping skills for stress, intelligence level, addiction issues, etc. Therapeutic rapport was established. Interventions conducted today were geared towards assessing the pt's readiness for weight loss surgery and identifying and psychological contraindications for undergoing such a major life change. Social support was deemed strong (specific to weight loss surgery/weight loss in this manner and in a general sense): siblings, friends, wife, co-workers. Current psychological struggles were described as low, but included: depression related to being overweight, particularly as it makes him feel held back from being more active with grandchildren. Coping skills for distress and related to undergoing a major life change such as weight loss surgery/weight loss were deemed strong and included persevering, resiliency/tends to bounce back after difficult times, choosing to see good in life, finds humor in things, makes a point to do quality work, tends to be a responsible person, maintains quality relationships with others and makes a point to learn what will help him be successful with weight loss surgery. The pt endorsed having characteristics of readiness to undergo major life changes  inherent in the journey of weight loss surgery. The pt could speak to the detailed process of becoming ready mentally for this major life change, having 'suffered' enough, and feeling confident about his decision to have this surgery. The pt expressed gratitude for today's visit.     Past Family and Social History:      History of family mental health problems per pt: none endorsed    Psychosocial history: treatment of psychiatric care in the past (N/A), alcohol/substance abuse treatment in the past (N/A) , inpatient psychiatric care (N/A).    Mental Status Exam (MSE):  Hygiene:  good  Dress: normal  Attitude:  cooperative and proactive  Motor Activity: normal  Speech: normal  Mood:   level  Affect:  congruent  Thought Processes: normal  Thought Content:  normal  Suicidal Thoughts:  not endorsed  Homicidal Thoughts:  not endorsed  Crisis Safety Plan: not needed   Hallucinations:  none      Patient's Support Network Includes:  family, friends      Progress toward goal: there is evidence to suggest that he is taking measures to improve the quality of his life including seeking weight loss surgery.       Functional Status: high      Prognosis: good with weight loss surgery    Evaluation, Diagnoses, and Ability/Capacity to Respond to Treatment:      The pt presented to be struggling with depression situational to being overweight (BMI = 63.38, morbid obesity). Results of MSE demonstrated a functional status of high. Strengths: belief in self that he will be successful with weight loss surgery, etc (see detailed list of coping skills above). Needed for growth (CPT code requirement for Weaknesses): weight loss.      From a psychological standpoint, the pt presents as a good candidate for bariatric surgery. He is motivated for the surgery, has showed readiness for the lifestyle change in terms of starting to adjust his eating habits, and seems to have appropriate expectations of how to prepare and how to live after surgery  in order to lose weight successfully.    Treatment Plan:      Short term goals: Start improving his health by following up with his bariatric surgeon in order to receive weight loss surgery as soon as feasible/appropriate and demonstrate success with compliance to adhering to the recommended diet. Long term goals: reach a healthy weight and experience alleviation of depression via taking control of his health.     Anh Jiménez, PhD, LP

## 2024-11-09 LAB
25(OH)D3+25(OH)D2 SERPL-MCNC: 41.1 NG/ML (ref 30–100)
ALBUMIN SERPL-MCNC: 4.2 G/DL (ref 4.1–5.1)
ALP SERPL-CCNC: 96 IU/L (ref 44–121)
ALT SERPL-CCNC: 18 IU/L (ref 0–44)
AST SERPL-CCNC: 15 IU/L (ref 0–40)
BASOPHILS # BLD AUTO: 0.1 X10E3/UL (ref 0–0.2)
BASOPHILS NFR BLD AUTO: 0 %
BILIRUB SERPL-MCNC: 0.4 MG/DL (ref 0–1.2)
BUN SERPL-MCNC: 19 MG/DL (ref 6–20)
BUN/CREAT SERPL: 22 (ref 9–20)
CALCIUM SERPL-MCNC: 9.6 MG/DL (ref 8.7–10.2)
CHLORIDE SERPL-SCNC: 104 MMOL/L (ref 96–106)
CHOLEST SERPL-MCNC: 151 MG/DL (ref 100–199)
CO2 SERPL-SCNC: 26 MMOL/L (ref 20–29)
CREAT SERPL-MCNC: 0.88 MG/DL (ref 0.76–1.27)
EGFRCR SERPLBLD CKD-EPI 2021: 112 ML/MIN/1.73
EOSINOPHIL # BLD AUTO: 0.4 X10E3/UL (ref 0–0.4)
EOSINOPHIL NFR BLD AUTO: 3 %
ERYTHROCYTE [DISTWIDTH] IN BLOOD BY AUTOMATED COUNT: 13.5 % (ref 11.6–15.4)
GLOBULIN SER CALC-MCNC: 2.5 G/DL (ref 1.5–4.5)
GLUCOSE SERPL-MCNC: 100 MG/DL (ref 70–99)
HBA1C MFR BLD: 5.9 % (ref 4.8–5.6)
HCT VFR BLD AUTO: 43.4 % (ref 37.5–51)
HDLC SERPL-MCNC: 47 MG/DL
HGB BLD-MCNC: 14.2 G/DL (ref 13–17.7)
IMM GRANULOCYTES # BLD AUTO: 0 X10E3/UL (ref 0–0.1)
IMM GRANULOCYTES NFR BLD AUTO: 0 %
LDLC SERPL CALC-MCNC: 91 MG/DL (ref 0–99)
LYMPHOCYTES # BLD AUTO: 3.6 X10E3/UL (ref 0.7–3.1)
LYMPHOCYTES NFR BLD AUTO: 32 %
MCH RBC QN AUTO: 28.5 PG (ref 26.6–33)
MCHC RBC AUTO-ENTMCNC: 32.7 G/DL (ref 31.5–35.7)
MCV RBC AUTO: 87 FL (ref 79–97)
MONOCYTES # BLD AUTO: 1.2 X10E3/UL (ref 0.1–0.9)
MONOCYTES NFR BLD AUTO: 11 %
NEUTROPHILS # BLD AUTO: 5.9 X10E3/UL (ref 1.4–7)
NEUTROPHILS NFR BLD AUTO: 54 %
PLATELET # BLD AUTO: 318 X10E3/UL (ref 150–450)
POTASSIUM SERPL-SCNC: 4.5 MMOL/L (ref 3.5–5.2)
PROT SERPL-MCNC: 6.7 G/DL (ref 6–8.5)
RBC # BLD AUTO: 4.99 X10E6/UL (ref 4.14–5.8)
SODIUM SERPL-SCNC: 141 MMOL/L (ref 134–144)
TRIGL SERPL-MCNC: 65 MG/DL (ref 0–149)
TSH SERPL DL<=0.005 MIU/L-ACNC: 2.75 UIU/ML (ref 0.45–4.5)
UREA BREATH TEST QL: NEGATIVE
VLDLC SERPL CALC-MCNC: 13 MG/DL (ref 5–40)
WBC # BLD AUTO: 11.1 X10E3/UL (ref 3.4–10.8)

## 2024-11-11 ENCOUNTER — OFFICE VISIT (OUTPATIENT)
Dept: CARDIOLOGY | Facility: CLINIC | Age: 39
End: 2024-11-11
Payer: COMMERCIAL

## 2024-11-11 VITALS
WEIGHT: 315 LBS | OXYGEN SATURATION: 94 % | DIASTOLIC BLOOD PRESSURE: 73 MMHG | SYSTOLIC BLOOD PRESSURE: 116 MMHG | HEART RATE: 76 BPM | BODY MASS INDEX: 50.62 KG/M2 | HEIGHT: 66 IN

## 2024-11-11 DIAGNOSIS — R06.02 SHORTNESS OF BREATH: ICD-10-CM

## 2024-11-11 DIAGNOSIS — F17.211 CIGARETTE NICOTINE DEPENDENCE IN REMISSION: ICD-10-CM

## 2024-11-11 DIAGNOSIS — E66.01 CLASS 3 SEVERE OBESITY DUE TO EXCESS CALORIES WITHOUT SERIOUS COMORBIDITY WITH BODY MASS INDEX (BMI) OF 60.0 TO 69.9 IN ADULT: ICD-10-CM

## 2024-11-11 DIAGNOSIS — I10 ESSENTIAL HYPERTENSION: ICD-10-CM

## 2024-11-11 DIAGNOSIS — R73.03 PREDIABETES: ICD-10-CM

## 2024-11-11 DIAGNOSIS — E66.813 CLASS 3 SEVERE OBESITY DUE TO EXCESS CALORIES WITHOUT SERIOUS COMORBIDITY WITH BODY MASS INDEX (BMI) OF 60.0 TO 69.9 IN ADULT: ICD-10-CM

## 2024-11-11 DIAGNOSIS — Z01.810 PRE-OPERATIVE CARDIOVASCULAR EXAMINATION: Primary | ICD-10-CM

## 2024-11-11 DIAGNOSIS — G47.33 OSA ON CPAP: ICD-10-CM

## 2024-11-11 DIAGNOSIS — R07.2 PRECORDIAL CHEST PAIN: ICD-10-CM

## 2024-11-11 PROCEDURE — 99214 OFFICE O/P EST MOD 30 MIN: CPT | Performed by: SPECIALIST

## 2024-11-11 RX ORDER — LOSARTAN POTASSIUM 25 MG/1
25 TABLET ORAL DAILY
Qty: 90 TABLET | Refills: 1 | Status: SHIPPED | OUTPATIENT
Start: 2024-11-11

## 2024-11-11 NOTE — PROGRESS NOTES
Subjective   Follow up, preoperative cardiac risk assessment for bariatric surgery  Emmett Valladares is a 39 y.o. male who presents to day for Surgical Clearance.    CHIEF COMPLIANT  Chief Complaint   Patient presents with    Surgical Clearance       Active Problems:  Problem List Items Addressed This Visit          Cardiac and Vasculature    Precordial chest pain    Essential hypertension    Relevant Medications    losartan (COZAAR) 25 MG tablet    Pre-operative cardiovascular examination - Primary       Endocrine and Metabolic    Class 3 severe obesity due to excess calories with body mass index (BMI) of 60.0 to 69.9 in adult    Prediabetes       Pulmonary and Pneumonias    Shortness of breath       Sleep    JASEN on CPAP       Tobacco    Cigarette nicotine dependence in remission       HPI  HPI  Patient is coming for follow-up today he did not have any of the cardiac workup done he said that he has been doing a lot better now with no chest pain for many many weeks he continues to have mild shortness of breath with exertion which she alluded this to his weight, lower extremity edema no palpitations he has been using CPAP every night  PRIOR MEDS  Current Outpatient Medications on File Prior to Visit   Medication Sig Dispense Refill    ibuprofen (ADVIL,MOTRIN) 800 MG tablet Take 1 tablet by mouth Every 8 (Eight) Hours As Needed for Mild Pain.      traZODone (DESYREL) 50 MG tablet Take 1 tablet by mouth At Night As Needed for Sleep.      [DISCONTINUED] losartan (COZAAR) 25 MG tablet Take 1 tablet by mouth Daily.       No current facility-administered medications on file prior to visit.       ALLERGIES  Patient has no known allergies.    HISTORY  Past Medical History:   Diagnosis Date    Fatigue     Former smoker     quit 4 yrs ago    Hypertension     Insomnia     Joint pain     daily NSAIDs, prn steroid joint injections R knee (2024)    Prediabetes     Sleep apnea     CPAP compliant       Social History     Socioeconomic  "History    Marital status:    Tobacco Use    Smoking status: Former     Current packs/day: 0.00     Types: Cigarettes     Quit date: 10/2020     Years since quittin.1     Passive exposure: Past    Smokeless tobacco: Never   Vaping Use    Vaping status: Never Used   Substance and Sexual Activity    Alcohol use: Not Currently     Comment: occasional    Drug use: Never    Sexual activity: Defer       Family History   Problem Relation Age of Onset    Gout Mother     Obesity Mother     Sleep apnea Mother     Obesity Maternal Grandmother     Hypertension Maternal Grandmother     Cancer Maternal Grandfather         bone and lung  at age 59       Review of Systems   Respiratory:  Positive for shortness of breath. Negative for apnea, cough, choking, chest tightness, wheezing and stridor.    Cardiovascular:  Positive for leg swelling. Negative for chest pain and palpitations.       Objective     VITALS: /73 (BP Location: Left arm, Patient Position: Sitting, Cuff Size: Adult)   Pulse 76   Ht 166.4 cm (65.51\")   Wt (!) 179 kg (395 lb)   SpO2 94%   BMI 64.71 kg/m²     LABS:   Lab Results (most recent)       None            IMAGING:   No Images in the past 120 days found..    EXAM:  Physical Exam  Constitutional:       Appearance: He is well-developed.   HENT:      Head: Normocephalic and atraumatic.   Eyes:      Pupils: Pupils are equal, round, and reactive to light.   Neck:      Thyroid: No thyromegaly.      Vascular: No JVD.   Cardiovascular:      Rate and Rhythm: Normal rate and regular rhythm.      Chest Wall: PMI is not displaced.      Pulses: Normal pulses.      Heart sounds: Normal heart sounds, S1 normal and S2 normal. No murmur heard.     No friction rub. No gallop.   Pulmonary:      Effort: Pulmonary effort is normal. No respiratory distress.      Breath sounds: Normal breath sounds. No stridor. No wheezing or rales.   Chest:      Chest wall: No tenderness.   Abdominal:      General: Bowel " sounds are normal. There is no distension.      Palpations: Abdomen is soft. There is no mass.      Tenderness: There is no abdominal tenderness. There is no guarding or rebound.   Musculoskeletal:      Cervical back: Neck supple. No edema.   Skin:     General: Skin is warm and dry.      Coloration: Skin is not pale.      Findings: No erythema or rash.   Neurological:      Mental Status: He is alert and oriented to person, place, and time.      Cranial Nerves: No cranial nerve deficit.      Coordination: Coordination normal.   Psychiatric:         Behavior: Behavior normal.         Procedure   Procedures        Assessment & Plan     Diagnoses and all orders for this visit:    1. Pre-operative cardiovascular examination (Primary)    2. Essential hypertension    3. Precordial chest pain    4. Prediabetes    5. Class 3 severe obesity due to excess calories without serious comorbidity with body mass index (BMI) of 60.0 to 69.9 in adult    6. Shortness of breath    7. JASEN on CPAP    8. Cigarette nicotine dependence in remission    Other orders  -     losartan (COZAAR) 25 MG tablet; Take 1 tablet by mouth Daily.  Dispense: 90 tablet; Refill: 1    1.  He is going for bariatric surgery has not had any of the cardiac workup done yet but his chest pain has resolved with now so proceed with treadmill stress testing for assessment of ischemia will also get an echocardiac to assess cardiac function wall motion and valve morphology  2.  Blood pressure is well-controlled we will continue current management  3.  We discussed lab results with hemoglobin A1c is up from 5.7-5.9 with elevated fasting glucose his lipids are acceptable his LDL is slightly suboptimal at 91 we talked about diet and exercise  4.  He has been using CPAP every night  5.  He still not smoking      Return in about 3 months (around 2/11/2025).                   MEDS ORDERED DURING VISIT:  New Medications Ordered This Visit   Medications    losartan (COZAAR) 25  MG tablet     Sig: Take 1 tablet by mouth Daily.     Dispense:  90 tablet     Refill:  1       As always, Audrey Arcos  I appreciate very much the opportunity to participate in the cardiovascular care of your patients. Please do not hesitate to call me with any questions with regards to Emmett Valladares evaluation and management.         This document has been electronically signed by Dennis Kim MD  November 11, 2024 09:43 EST    This note is dictated utilizing voice recognition software.

## 2024-11-12 ENCOUNTER — DOCUMENTATION (OUTPATIENT)
Dept: CARDIOLOGY | Facility: CLINIC | Age: 39
End: 2024-11-12
Payer: COMMERCIAL

## 2024-11-12 ENCOUNTER — HOSPITAL ENCOUNTER (OUTPATIENT)
Dept: PULMONOLOGY | Facility: HOSPITAL | Age: 39
Discharge: HOME | End: 2024-11-12
Admitting: NURSE PRACTITIONER
Payer: COMMERCIAL

## 2024-11-12 DIAGNOSIS — Z87.891 FORMER SMOKER: ICD-10-CM

## 2024-11-12 DIAGNOSIS — Z01.810 PRE-OPERATIVE CARDIOVASCULAR EXAMINATION: Primary | ICD-10-CM

## 2024-11-12 PROCEDURE — 94726 PLETHYSMOGRAPHY LUNG VOLUMES: CPT

## 2024-11-12 PROCEDURE — 94010 BREATHING CAPACITY TEST: CPT

## 2024-11-12 PROCEDURE — 94729 DIFFUSING CAPACITY: CPT

## 2024-11-15 ENCOUNTER — HOSPITAL ENCOUNTER (OUTPATIENT)
Facility: HOSPITAL | Age: 39
Discharge: HOME | End: 2024-11-15
Admitting: SPECIALIST
Payer: COMMERCIAL

## 2024-11-15 ENCOUNTER — PATIENT ROUNDING (BHMG ONLY) (OUTPATIENT)
Dept: BARIATRICS/WEIGHT MGMT | Facility: CLINIC | Age: 39
End: 2024-11-15
Payer: COMMERCIAL

## 2024-11-15 DIAGNOSIS — R06.02 SHORTNESS OF BREATH: ICD-10-CM

## 2024-11-15 DIAGNOSIS — R07.2 PRECORDIAL CHEST PAIN: ICD-10-CM

## 2024-11-15 LAB
BH CV ECHO MEAS - AO MAX PG: 11.7 MMHG
BH CV ECHO MEAS - AO MEAN PG: 5.8 MMHG
BH CV ECHO MEAS - AO ROOT DIAM: 3.4 CM
BH CV ECHO MEAS - AO V2 MAX: 171 CM/SEC
BH CV ECHO MEAS - AO V2 VTI: 33.5 CM
BH CV ECHO MEAS - EDV(MOD-SP4): 107.3 ML
BH CV ECHO MEAS - EF(MOD-SP4): 69.3 %
BH CV ECHO MEAS - ESV(MOD-SP4): 32.9 ML
BH CV ECHO MEAS - IVS/LVPW: 1.24 CM
BH CV ECHO MEAS - IVSD: 1.65 CM
BH CV ECHO MEAS - LA DIMENSION: 5.1 CM
BH CV ECHO MEAS - LAT PEAK E' VEL: 10.6 CM/SEC
BH CV ECHO MEAS - LV DIASTOLIC VOL/BSA (35-75): 40.1 CM2
BH CV ECHO MEAS - LV MAX PG: 15.2 MMHG
BH CV ECHO MEAS - LV MEAN PG: 7 MMHG
BH CV ECHO MEAS - LV SYSTOLIC VOL/BSA (12-30): 12.3 CM2
BH CV ECHO MEAS - LV V1 MAX: 195 CM/SEC
BH CV ECHO MEAS - LV V1 VTI: 39.8 CM
BH CV ECHO MEAS - LVIDD: 5.1 CM
BH CV ECHO MEAS - LVIDS: 2.6 CM
BH CV ECHO MEAS - LVOT DIAM: 1.82 CM
BH CV ECHO MEAS - LVPWD: 1.33 CM
BH CV ECHO MEAS - MED PEAK E' VEL: 12.4 CM/SEC
BH CV ECHO MEAS - PA ACC TIME: 0.18 SEC
BH CV ECHO MEAS - RAP SYSTOLE: 10 MMHG
BH CV ECHO MEAS - RVSP: 16.1 MMHG
BH CV ECHO MEAS - SV(MOD-SP4): 74.4 ML
BH CV ECHO MEAS - SVI(MOD-SP4): 27.8 ML/M2
BH CV ECHO MEAS - TAPSE (>1.6): 2.8 CM
BH CV ECHO MEAS - TR MAX PG: 6.1 MMHG
BH CV ECHO MEAS - TR MAX VEL: 121.7 CM/SEC

## 2024-11-15 PROCEDURE — 93306 TTE W/DOPPLER COMPLETE: CPT

## 2024-11-15 NOTE — PROGRESS NOTES
A [employer] message has been sent to the patient for PATIENT ROUNDING for [facility] - Bariatric Surgery/[facility] Weight Mgmt.

## 2024-11-22 ENCOUNTER — HOSPITAL ENCOUNTER (OUTPATIENT)
Dept: CARDIOLOGY | Facility: HOSPITAL | Age: 39
Discharge: HOME | End: 2024-11-22
Admitting: NURSE PRACTITIONER
Payer: COMMERCIAL

## 2024-11-22 DIAGNOSIS — Z01.810 PRE-OPERATIVE CARDIOVASCULAR EXAMINATION: ICD-10-CM

## 2024-11-22 LAB
BH CV STRESS BP STAGE 1: NORMAL
BH CV STRESS DURATION MIN STAGE 1: 3
BH CV STRESS DURATION MIN STAGE 2: 1
BH CV STRESS DURATION SEC STAGE 1: 0
BH CV STRESS DURATION SEC STAGE 2: 35
BH CV STRESS GRADE STAGE 1: 10
BH CV STRESS GRADE STAGE 2: 12
BH CV STRESS HR STAGE 1: 134
BH CV STRESS HR STAGE 2: 153
BH CV STRESS METS STAGE 1: 5
BH CV STRESS METS STAGE 2: 7.5
BH CV STRESS PROTOCOL 1: NORMAL
BH CV STRESS RECOVERY BP: NORMAL MMHG
BH CV STRESS RECOVERY HR: 99 BPM
BH CV STRESS SPEED STAGE 1: 1.7
BH CV STRESS SPEED STAGE 2: 2.5
BH CV STRESS STAGE 1: 1
BH CV STRESS STAGE 2: 2
MAXIMAL PREDICTED HEART RATE: 181 BPM
PERCENT MAX PREDICTED HR: 84.53 %
STRESS BASELINE BP: NORMAL MMHG
STRESS BASELINE HR: 89 BPM
STRESS PERCENT HR: 99 %
STRESS POST ESTIMATED WORKLOAD: 7 METS
STRESS POST EXERCISE DUR MIN: 4 MIN
STRESS POST EXERCISE DUR SEC: 35 SEC
STRESS POST PEAK BP: NORMAL MMHG
STRESS POST PEAK HR: 153 BPM
STRESS TARGET HR: 154 BPM

## 2024-11-22 PROCEDURE — 93017 CV STRESS TEST TRACING ONLY: CPT

## 2024-11-26 ENCOUNTER — TELEPHONE (OUTPATIENT)
Dept: CARDIOLOGY | Facility: CLINIC | Age: 39
End: 2024-11-26
Payer: COMMERCIAL

## 2024-11-26 NOTE — TELEPHONE ENCOUNTER
Patient wanted to know if the results was normal on his test. They are thinking that Dr. Kim said he may not have to come back in for the surgery clearance if the results was normal. Can someone check on this for the patient and give them a call.     Thanks!

## 2024-12-02 NOTE — TELEPHONE ENCOUNTER
Called pt and advised him that he has been cleared and the form was scanned into his chart. He expressed understanding.

## 2025-01-17 DIAGNOSIS — R06.00 DYSPNEA, UNSPECIFIED TYPE: ICD-10-CM

## 2025-01-17 DIAGNOSIS — R53.83 FATIGUE, UNSPECIFIED TYPE: Primary | ICD-10-CM

## 2025-01-23 ENCOUNTER — LAB (OUTPATIENT)
Dept: LAB | Facility: HOSPITAL | Age: 40
End: 2025-01-23
Payer: COMMERCIAL

## 2025-01-23 DIAGNOSIS — R53.83 FATIGUE, UNSPECIFIED TYPE: ICD-10-CM

## 2025-01-23 DIAGNOSIS — R06.00 DYSPNEA, UNSPECIFIED TYPE: ICD-10-CM

## 2025-01-23 LAB
ALBUMIN SERPL-MCNC: 4.1 G/DL (ref 3.5–5.2)
ALBUMIN/GLOB SERPL: 1.3 G/DL
ALP SERPL-CCNC: 72 U/L (ref 39–117)
ALT SERPL W P-5'-P-CCNC: 25 U/L (ref 1–41)
ANION GAP SERPL CALCULATED.3IONS-SCNC: 11 MMOL/L (ref 5–15)
AST SERPL-CCNC: 22 U/L (ref 1–40)
BILIRUB SERPL-MCNC: 0.5 MG/DL (ref 0–1.2)
BUN SERPL-MCNC: 26 MG/DL (ref 6–20)
BUN/CREAT SERPL: 32.5 (ref 7–25)
CALCIUM SPEC-SCNC: 8.7 MG/DL (ref 8.6–10.5)
CHLORIDE SERPL-SCNC: 104 MMOL/L (ref 98–107)
CO2 SERPL-SCNC: 25 MMOL/L (ref 22–29)
CREAT SERPL-MCNC: 0.8 MG/DL (ref 0.76–1.27)
DEPRECATED RDW RBC AUTO: 48.4 FL (ref 37–54)
EGFRCR SERPLBLD CKD-EPI 2021: 115.5 ML/MIN/1.73
ERYTHROCYTE [DISTWIDTH] IN BLOOD BY AUTOMATED COUNT: 14.6 % (ref 12.3–15.4)
GLOBULIN UR ELPH-MCNC: 3.2 GM/DL
GLUCOSE SERPL-MCNC: 101 MG/DL (ref 65–99)
HCT VFR BLD AUTO: 42.5 % (ref 37.5–51)
HGB BLD-MCNC: 13.7 G/DL (ref 13–17.7)
MCH RBC QN AUTO: 29.1 PG (ref 26.6–33)
MCHC RBC AUTO-ENTMCNC: 32.2 G/DL (ref 31.5–35.7)
MCV RBC AUTO: 90.4 FL (ref 79–97)
PLATELET # BLD AUTO: 297 10*3/MM3 (ref 140–450)
PMV BLD AUTO: 9.7 FL (ref 6–12)
POTASSIUM SERPL-SCNC: 4.5 MMOL/L (ref 3.5–5.2)
PROT SERPL-MCNC: 7.3 G/DL (ref 6–8.5)
RBC # BLD AUTO: 4.7 10*6/MM3 (ref 4.14–5.8)
SODIUM SERPL-SCNC: 140 MMOL/L (ref 136–145)
WBC NRBC COR # BLD AUTO: 9.26 10*3/MM3 (ref 3.4–10.8)

## 2025-01-23 PROCEDURE — 85027 COMPLETE CBC AUTOMATED: CPT

## 2025-01-23 PROCEDURE — 80053 COMPREHEN METABOLIC PANEL: CPT

## 2025-01-23 PROCEDURE — 36415 COLL VENOUS BLD VENIPUNCTURE: CPT

## 2025-01-27 ENCOUNTER — CONSULT (OUTPATIENT)
Dept: BARIATRICS/WEIGHT MGMT | Facility: CLINIC | Age: 40
End: 2025-01-27
Payer: COMMERCIAL

## 2025-01-27 VITALS
TEMPERATURE: 98 F | DIASTOLIC BLOOD PRESSURE: 82 MMHG | OXYGEN SATURATION: 95 % | WEIGHT: 315 LBS | HEART RATE: 98 BPM | RESPIRATION RATE: 18 BRPM | BODY MASS INDEX: 50.62 KG/M2 | SYSTOLIC BLOOD PRESSURE: 130 MMHG | HEIGHT: 66 IN

## 2025-01-27 DIAGNOSIS — E66.01 MORBID OBESITY WITH BMI OF 60.0-69.9, ADULT: Primary | ICD-10-CM

## 2025-01-27 DIAGNOSIS — R73.03 PREDIABETES: ICD-10-CM

## 2025-01-27 DIAGNOSIS — M25.50 ARTHRALGIA, UNSPECIFIED JOINT: ICD-10-CM

## 2025-01-27 DIAGNOSIS — G47.33 OSA ON CPAP: ICD-10-CM

## 2025-01-27 DIAGNOSIS — I10 ESSENTIAL HYPERTENSION: ICD-10-CM

## 2025-01-27 PROCEDURE — 99215 OFFICE O/P EST HI 40 MIN: CPT | Performed by: SURGERY

## 2025-01-27 RX ORDER — SCOPOLAMINE 1 MG/3D
1 PATCH, EXTENDED RELEASE TRANSDERMAL CONTINUOUS
OUTPATIENT
Start: 2025-01-27 | End: 2025-01-30

## 2025-01-27 RX ORDER — CHLORHEXIDINE GLUCONATE ORAL RINSE 1.2 MG/ML
15 SOLUTION DENTAL
OUTPATIENT
Start: 2025-01-27 | End: 2025-01-27

## 2025-01-27 RX ORDER — PANTOPRAZOLE SODIUM 40 MG/10ML
40 INJECTION, POWDER, LYOPHILIZED, FOR SOLUTION INTRAVENOUS ONCE
OUTPATIENT
Start: 2025-01-27 | End: 2025-01-27

## 2025-01-27 RX ORDER — GABAPENTIN 100 MG/1
600 CAPSULE ORAL ONCE
OUTPATIENT
Start: 2025-01-27 | End: 2025-01-27

## 2025-01-27 RX ORDER — SODIUM CHLORIDE 9 MG/ML
150 INJECTION, SOLUTION INTRAVENOUS CONTINUOUS
OUTPATIENT
Start: 2025-01-27 | End: 2025-02-03

## 2025-01-27 RX ORDER — ACETAMINOPHEN 500 MG
1000 TABLET ORAL ONCE
OUTPATIENT
Start: 2025-01-27 | End: 2025-01-27

## 2025-01-27 NOTE — H&P (VIEW-ONLY)
[facility] GROUP BARIATRIC SURGERY  [address]  [address]  [facility] [address] [phone]      Patient  Name:  Emmett Valladares  :  1985      Date of Visit: 2025      Chief Complaint:  weight gain; unable to maintain weight loss    History of Present Illness:  Emmett Valladares is a 39 y.o. male who presents today for evaluation, education and consultation regarding weight loss surgery.     Patient has been overweight for many years, with numerous failed dietary/weight loss attempts.  He now has obesity related comorbidities of HTN, OA, JASEN, prediabetes and as such has decided to pursue weight loss surgery.        2025 Update:    The patient lives in [place], [place], and is a .    No personal or family hx of VTE or clotting d/o.  No liver, lung, heart, or renal disease    He already started his preop diet.    Review of data:     LUH: nothing  HP neg    CBC          2024    13:44 2024    13:03 2025    08:25   CBC   WBC 10.88  11.1  9.26    RBC 4.85  4.99  4.70    Hemoglobin 14.3  14.2  13.7    Hematocrit 44.2  43.4  42.5    MCV 91.1  87  90.4    MCH 29.5  28.5  29.1    MCHC 32.4  32.7  32.2    RDW 13.7  13.5  14.6    Platelets 301  318  297      CMP          2024    13:44 2024    13:03 2025    08:25   CMP   Glucose 80  100  101    BUN 16  19  26    Creatinine 0.85  0.88  0.80    EGFR 113.4   115.5    Sodium 141  141  140    Potassium 4.3  4.5  4.5    Chloride 103  104  104    Calcium 9.4  9.6  8.7    Total Protein  6.7     Total Protein 7.0   7.3    Albumin 4.1  4.2  4.1    Globulin  2.5     Globulin 2.9   3.2    Total Bilirubin 0.3  0.4  0.5    Alkaline Phosphatase 90  96  72    AST (SGOT) 17  15  22    ALT (SGPT) 23  18  25    Albumin/Globulin Ratio 1.4   1.3    BUN/Creatinine Ratio 18.8  22  32.5    Anion Gap 11.6   11.0                 Cardiac clearance:low risk       PFTs:   Lung Volume  Lung volumes are consistent with air trapping.      Diffusion  The diffusing capacity for carbon monoxide, a reflection of alveolar-capillary gas transport, is normal.           Last tobacco: 5 years ago  Last NSAIDs: ibuprofen last night  Last ASA: n/a  Last steroids: 2024 for URI           Past Medical History:   Diagnosis Date    Fatigue     Former smoker     quit 4 yrs ago    Hypertension     Insomnia     Joint pain     daily NSAIDs, prn steroid joint injections R knee ()    Prediabetes     Sleep apnea     CPAP compliant     Past Surgical History:   Procedure Laterality Date    TONSILLECTOMY         No Known Allergies    Current Outpatient Medications:     ibuprofen (ADVIL,MOTRIN) 800 MG tablet, Take 1 tablet by mouth Every 8 (Eight) Hours As Needed for Mild Pain., Disp: , Rfl:     losartan (COZAAR) 25 MG tablet, Take 1 tablet by mouth Daily., Disp: 90 tablet, Rfl: 1    traZODone (DESYREL) 50 MG tablet, Take 1 tablet by mouth every night at bedtime. (Patient taking differently: Take 1 tablet by mouth Every Night.), Disp: 30 tablet, Rfl: 0    apixaban (Eliquis) 2.5 MG tablet tablet, Take 1 tablet by mouth 2 (Two) Times a Day., Disp: 42 tablet, Rfl: 0    Social History     Socioeconomic History    Marital status:    Tobacco Use    Smoking status: Former     Current packs/day: 0.00     Types: Cigarettes     Quit date: 10/2020     Years since quittin.3     Passive exposure: Past    Smokeless tobacco: Never   Vaping Use    Vaping status: Never Used   Substance and Sexual Activity    Alcohol use: Not Currently     Comment: occasional    Drug use: Never    Sexual activity: Defer     Social History     Social History Narrative    Patient lives in [place], [place]. He is  with 4 children. He works full-time as a  at [employer] in [place].         Family History   Problem Relation Age of Onset    Gout Mother     Obesity Mother     Sleep apnea Mother     Obesity Maternal Grandmother     Hypertension Maternal Grandmother     Cancer  Maternal Grandfather         bone and lung  at age 59       Review of Systems    Physical Exam:  Vital Signs:  Weight: (!) 179 kg (394 lb)   Body mass index is 64.57 kg/m².  Temp: 98 °F (36.7 °C)   Heart Rate: 98   BP: 130/82     Physical Exam  Vitals reviewed.   Constitutional:       Appearance: He is well-developed.   HENT:      Head: Normocephalic and atraumatic.      Nose: Nose normal.   Eyes:      Conjunctiva/sclera: Conjunctivae normal.      Pupils: Pupils are equal, round, and reactive to light.   Neck:      Thyroid: No thyromegaly.      Vascular: No carotid bruit.      Trachea: No tracheal deviation.   Cardiovascular:      Rate and Rhythm: Normal rate and regular rhythm.      Heart sounds: Normal heart sounds.   Pulmonary:      Effort: Pulmonary effort is normal. No respiratory distress.      Breath sounds: Normal breath sounds.   Abdominal:      General: There is no distension.      Palpations: Abdomen is soft.      Tenderness: There is no abdominal tenderness.      Comments: No surgical scars   Musculoskeletal:         General: No deformity. Normal range of motion.      Cervical back: Normal range of motion and neck supple.   Skin:     General: Skin is warm and dry.      Findings: No rash.   Neurological:      Mental Status: He is alert and oriented to person, place, and time.      Cranial Nerves: No cranial nerve deficit.      Coordination: Coordination normal.   Psychiatric:         Behavior: Behavior normal.         Thought Content: Thought content normal.         Judgment: Judgment normal.       Problem List Items Addressed This Visit       Prediabetes    JASEN on CPAP    Joint pain    Essential hypertension     Other Visit Diagnoses       Morbid obesity with BMI of 60.0-69.9, adult    -  Primary            Assessment:    Emmett Valladares is a 39 y.o. year old male with medically complicated obesity.    Weight loss surgery is deemed medically necessary given the following obesity related comorbidities  "including HTN, OA, JASEN, prediabetes with current Weight: (!) 179 kg (394 lb) and Body mass index is 64.57 kg/m²..    Patient is aware that surgery is a tool, and that weight loss is not guaranteed but only seen in the context of appropriate use, follow up and exercise.    The patient was present for an approximately a 2.5 hour discussion of the purpose of weight loss surgery, how WLS is a tool to assist in achieving weight loss goals, the most common complications and how best to avoid them, and the strategies for short and long term weight loss.  Ample opportunity to discuss questions was available both in group and during the time of individual examination.    I reviewed all available preop labs, Xrays, tests, clearances, etc and signed off on these in the record.  All of this in addition to the patient's unique history and exam has been taken into consideration in determining their appropriate candidacy for weight loss surgery.    Complications  of laparoscopic/possible robotic gastric sleeve were discussed. The patient is well aware of the potential complications of surgery that include but not limited to bleeding, infections, deep venous thrombosis, pulmonary embolism, pulmonary complications such as pneumonia, cardiac events, hernias, small bowel obstruction, damage to the spleen or other organs, bowel injury, disfiguring scars, failure to lose weight, need for additional surgery, conversion to an open procedure, and death. Patient is also aware of complications which apply in this particular procedure that can include but are not limited to a \"leak\" at the staple line which in some instances may require conversion to gastric bypass.    The patient is aware if a hiatal hernia is encountered, it likely will be repaired.  R/B/A Rx to hiatal hernia repair were discussed as outlined in our long consent form.  Briefly risks in addition to those for LSG include recurrent hernia, TAMARA, dysphagia, esophageal injury, " pneumothorax, injury to the vagus nerves, injury to the thoracic duct, aorta or vena cava.    Greater than 3 minutes was spent with the patient discussing avoiding all tobacco products and second hand smoke at least 2 weeks pre-operatively and 6 weeks post-operatively to minimize the risk of sleeve leak.  This included discussing the importance of avoiding even secondhand smoke as the risk of leak is increased.  Examples discussed:  I made it very clear that the patient understands they should avoid even riding in a car where someone has previously smoked in the last 2 weeks, living in a house where someone smokes (even if it's in a separate room/patio/attached garage, etc.) we discussed that they should not have a conversation with a group of people who are smoking even if it's outside.  They can be around wood burning fires and barbecue.  I told them I do not know if marijuana has a same effects but my overall recommendation is to avoid it for 2 weeks prior in 6 weeks after surgery.  They also are aware that nicotine may also increase the risk of leak and I strongly encouraged him to avoid that as well for 2 weeks prior in 6 weeks after surgery.    Discussed the risks, benefits and alternative therapies at great length as outlined in our extensive consent forms, consent videos, and educational teaching process under the direction of the center's .    A copy of the patient's signed informed consent is on file.    Plan:  Laparoscopic robotic assisted sleeve gastrectomy at [facility]      R/B/A Rx discussed to postop anticoagulation including but not limited to bleeding, drug reaction, venothromboembolic events, etc. and he is agreeable to taking 3 weeks of Eliquis.    MDM high:  Elective procedure with the following risk factors: morbid obesity, JASEN, HTN  4+ chronic medical problems reviewed.    I spent 45 minutes caring for Emmett on this date of service. This time includes time spent by me in the  following activities: preparing for the visit, reviewing tests, performing a medically appropriate examination and/or evaluation, counseling and educating the patient/family/caregiver, referring and communicating with other health care professionals, documenting information in the medical record, independently interpreting results and communicating that information with the patient/family/caregiver, care coordination, ordering medications, ordering test(s), ordering procedure(s), obtaining a separately obtained history, and reviewing a separately obtained history.       Thank you Audrey Arcos for allowing me to share in the care of our mutual patient.             Tracee iMchel MD

## 2025-01-27 NOTE — PROGRESS NOTES
[facility] GROUP BARIATRIC SURGERY  [address]  [address]  [facility] [address] [phone]      Patient  Name:  Emmett Valladares  :  1985      Date of Visit: 2025      Chief Complaint:  weight gain; unable to maintain weight loss    History of Present Illness:  Emmett Valladares is a 39 y.o. male who presents today for evaluation, education and consultation regarding weight loss surgery.     Patient has been overweight for many years, with numerous failed dietary/weight loss attempts.  He now has obesity related comorbidities of HTN, OA, JASEN, prediabetes and as such has decided to pursue weight loss surgery.        2025 Update:    The patient lives in [place], [place], and is a .    No personal or family hx of VTE or clotting d/o.  No liver, lung, heart, or renal disease    He already started his preop diet.    Review of data:     LUH: nothing  HP neg    CBC          2024    13:44 2024    13:03 2025    08:25   CBC   WBC 10.88  11.1  9.26    RBC 4.85  4.99  4.70    Hemoglobin 14.3  14.2  13.7    Hematocrit 44.2  43.4  42.5    MCV 91.1  87  90.4    MCH 29.5  28.5  29.1    MCHC 32.4  32.7  32.2    RDW 13.7  13.5  14.6    Platelets 301  318  297      CMP          2024    13:44 2024    13:03 2025    08:25   CMP   Glucose 80  100  101    BUN 16  19  26    Creatinine 0.85  0.88  0.80    EGFR 113.4   115.5    Sodium 141  141  140    Potassium 4.3  4.5  4.5    Chloride 103  104  104    Calcium 9.4  9.6  8.7    Total Protein  6.7     Total Protein 7.0   7.3    Albumin 4.1  4.2  4.1    Globulin  2.5     Globulin 2.9   3.2    Total Bilirubin 0.3  0.4  0.5    Alkaline Phosphatase 90  96  72    AST (SGOT) 17  15  22    ALT (SGPT) 23  18  25    Albumin/Globulin Ratio 1.4   1.3    BUN/Creatinine Ratio 18.8  22  32.5    Anion Gap 11.6   11.0                 Cardiac clearance:low risk       PFTs:   Lung Volume  Lung volumes are consistent with air trapping.      Diffusion  The diffusing capacity for carbon monoxide, a reflection of alveolar-capillary gas transport, is normal.           Last tobacco: 5 years ago  Last NSAIDs: ibuprofen last night  Last ASA: n/a  Last steroids: 2024 for URI           Past Medical History:   Diagnosis Date    Fatigue     Former smoker     quit 4 yrs ago    Hypertension     Insomnia     Joint pain     daily NSAIDs, prn steroid joint injections R knee ()    Prediabetes     Sleep apnea     CPAP compliant     Past Surgical History:   Procedure Laterality Date    TONSILLECTOMY         No Known Allergies    Current Outpatient Medications:     ibuprofen (ADVIL,MOTRIN) 800 MG tablet, Take 1 tablet by mouth Every 8 (Eight) Hours As Needed for Mild Pain., Disp: , Rfl:     losartan (COZAAR) 25 MG tablet, Take 1 tablet by mouth Daily., Disp: 90 tablet, Rfl: 1    traZODone (DESYREL) 50 MG tablet, Take 1 tablet by mouth every night at bedtime. (Patient taking differently: Take 1 tablet by mouth Every Night.), Disp: 30 tablet, Rfl: 0    apixaban (Eliquis) 2.5 MG tablet tablet, Take 1 tablet by mouth 2 (Two) Times a Day., Disp: 42 tablet, Rfl: 0    Social History     Socioeconomic History    Marital status:    Tobacco Use    Smoking status: Former     Current packs/day: 0.00     Types: Cigarettes     Quit date: 10/2020     Years since quittin.3     Passive exposure: Past    Smokeless tobacco: Never   Vaping Use    Vaping status: Never Used   Substance and Sexual Activity    Alcohol use: Not Currently     Comment: occasional    Drug use: Never    Sexual activity: Defer     Social History     Social History Narrative    Patient lives in [place], [place]. He is  with 4 children. He works full-time as a  at [employer] in [place].         Family History   Problem Relation Age of Onset    Gout Mother     Obesity Mother     Sleep apnea Mother     Obesity Maternal Grandmother     Hypertension Maternal Grandmother     Cancer  Maternal Grandfather         bone and lung  at age 59       Review of Systems    Physical Exam:  Vital Signs:  Weight: (!) 179 kg (394 lb)   Body mass index is 64.57 kg/m².  Temp: 98 °F (36.7 °C)   Heart Rate: 98   BP: 130/82     Physical Exam  Vitals reviewed.   Constitutional:       Appearance: He is well-developed.   HENT:      Head: Normocephalic and atraumatic.      Nose: Nose normal.   Eyes:      Conjunctiva/sclera: Conjunctivae normal.      Pupils: Pupils are equal, round, and reactive to light.   Neck:      Thyroid: No thyromegaly.      Vascular: No carotid bruit.      Trachea: No tracheal deviation.   Cardiovascular:      Rate and Rhythm: Normal rate and regular rhythm.      Heart sounds: Normal heart sounds.   Pulmonary:      Effort: Pulmonary effort is normal. No respiratory distress.      Breath sounds: Normal breath sounds.   Abdominal:      General: There is no distension.      Palpations: Abdomen is soft.      Tenderness: There is no abdominal tenderness.      Comments: No surgical scars   Musculoskeletal:         General: No deformity. Normal range of motion.      Cervical back: Normal range of motion and neck supple.   Skin:     General: Skin is warm and dry.      Findings: No rash.   Neurological:      Mental Status: He is alert and oriented to person, place, and time.      Cranial Nerves: No cranial nerve deficit.      Coordination: Coordination normal.   Psychiatric:         Behavior: Behavior normal.         Thought Content: Thought content normal.         Judgment: Judgment normal.       Problem List Items Addressed This Visit       Prediabetes    JASEN on CPAP    Joint pain    Essential hypertension     Other Visit Diagnoses       Morbid obesity with BMI of 60.0-69.9, adult    -  Primary            Assessment:    mEmett Valladares is a 39 y.o. year old male with medically complicated obesity.    Weight loss surgery is deemed medically necessary given the following obesity related comorbidities  "including HTN, OA, JASEN, prediabetes with current Weight: (!) 179 kg (394 lb) and Body mass index is 64.57 kg/m²..    Patient is aware that surgery is a tool, and that weight loss is not guaranteed but only seen in the context of appropriate use, follow up and exercise.    The patient was present for an approximately a 2.5 hour discussion of the purpose of weight loss surgery, how WLS is a tool to assist in achieving weight loss goals, the most common complications and how best to avoid them, and the strategies for short and long term weight loss.  Ample opportunity to discuss questions was available both in group and during the time of individual examination.    I reviewed all available preop labs, Xrays, tests, clearances, etc and signed off on these in the record.  All of this in addition to the patient's unique history and exam has been taken into consideration in determining their appropriate candidacy for weight loss surgery.    Complications  of laparoscopic/possible robotic gastric sleeve were discussed. The patient is well aware of the potential complications of surgery that include but not limited to bleeding, infections, deep venous thrombosis, pulmonary embolism, pulmonary complications such as pneumonia, cardiac events, hernias, small bowel obstruction, damage to the spleen or other organs, bowel injury, disfiguring scars, failure to lose weight, need for additional surgery, conversion to an open procedure, and death. Patient is also aware of complications which apply in this particular procedure that can include but are not limited to a \"leak\" at the staple line which in some instances may require conversion to gastric bypass.    The patient is aware if a hiatal hernia is encountered, it likely will be repaired.  R/B/A Rx to hiatal hernia repair were discussed as outlined in our long consent form.  Briefly risks in addition to those for LSG include recurrent hernia, TAMARA, dysphagia, esophageal injury, " pneumothorax, injury to the vagus nerves, injury to the thoracic duct, aorta or vena cava.    Greater than 3 minutes was spent with the patient discussing avoiding all tobacco products and second hand smoke at least 2 weeks pre-operatively and 6 weeks post-operatively to minimize the risk of sleeve leak.  This included discussing the importance of avoiding even secondhand smoke as the risk of leak is increased.  Examples discussed:  I made it very clear that the patient understands they should avoid even riding in a car where someone has previously smoked in the last 2 weeks, living in a house where someone smokes (even if it's in a separate room/patio/attached garage, etc.) we discussed that they should not have a conversation with a group of people who are smoking even if it's outside.  They can be around wood burning fires and barbecue.  I told them I do not know if marijuana has a same effects but my overall recommendation is to avoid it for 2 weeks prior in 6 weeks after surgery.  They also are aware that nicotine may also increase the risk of leak and I strongly encouraged him to avoid that as well for 2 weeks prior in 6 weeks after surgery.    Discussed the risks, benefits and alternative therapies at great length as outlined in our extensive consent forms, consent videos, and educational teaching process under the direction of the center's .    A copy of the patient's signed informed consent is on file.    Plan:  Laparoscopic robotic assisted sleeve gastrectomy at [facility]      R/B/A Rx discussed to postop anticoagulation including but not limited to bleeding, drug reaction, venothromboembolic events, etc. and he is agreeable to taking 3 weeks of Eliquis.    MDM high:  Elective procedure with the following risk factors: morbid obesity, JASEN, HTN  4+ chronic medical problems reviewed.    I spent 45 minutes caring for Emmett on this date of service. This time includes time spent by me in the  following activities: preparing for the visit, reviewing tests, performing a medically appropriate examination and/or evaluation, counseling and educating the patient/family/caregiver, referring and communicating with other health care professionals, documenting information in the medical record, independently interpreting results and communicating that information with the patient/family/caregiver, care coordination, ordering medications, ordering test(s), ordering procedure(s), obtaining a separately obtained history, and reviewing a separately obtained history.       Thank you Audrey Arcos for allowing me to share in the care of our mutual patient.             Tracee Michel MD

## 2025-01-31 ENCOUNTER — PRE-ADMISSION TESTING (OUTPATIENT)
Dept: PREADMISSION TESTING | Facility: HOSPITAL | Age: 40
End: 2025-01-31
Payer: COMMERCIAL

## 2025-01-31 DIAGNOSIS — E66.01 MORBID OBESITY WITH BMI OF 60.0-69.9, ADULT: ICD-10-CM

## 2025-01-31 LAB
DEPRECATED RDW RBC AUTO: 45.7 FL (ref 37–54)
ERYTHROCYTE [DISTWIDTH] IN BLOOD BY AUTOMATED COUNT: 14.2 % (ref 12.3–15.4)
HBA1C MFR BLD: 5.6 % (ref 4.8–5.6)
HCT VFR BLD AUTO: 43.9 % (ref 37.5–51)
HGB BLD-MCNC: 14.3 G/DL (ref 13–17.7)
MCH RBC QN AUTO: 28.9 PG (ref 26.6–33)
MCHC RBC AUTO-ENTMCNC: 32.6 G/DL (ref 31.5–35.7)
MCV RBC AUTO: 88.7 FL (ref 79–97)
PLATELET # BLD AUTO: 314 10*3/MM3 (ref 140–450)
PMV BLD AUTO: 9.8 FL (ref 6–12)
POTASSIUM SERPL-SCNC: 4.5 MMOL/L (ref 3.5–5.2)
QT INTERVAL: 394 MS
QTC INTERVAL: 418 MS
RBC # BLD AUTO: 4.95 10*6/MM3 (ref 4.14–5.8)
WBC NRBC COR # BLD AUTO: 8.6 10*3/MM3 (ref 3.4–10.8)

## 2025-01-31 PROCEDURE — 93005 ELECTROCARDIOGRAM TRACING: CPT

## 2025-01-31 PROCEDURE — 84132 ASSAY OF SERUM POTASSIUM: CPT

## 2025-01-31 PROCEDURE — 85027 COMPLETE CBC AUTOMATED: CPT

## 2025-01-31 PROCEDURE — 36415 COLL VENOUS BLD VENIPUNCTURE: CPT

## 2025-01-31 PROCEDURE — 83036 HEMOGLOBIN GLYCOSYLATED A1C: CPT

## 2025-01-31 PROCEDURE — 87081 CULTURE SCREEN ONLY: CPT

## 2025-01-31 PROCEDURE — 93010 ELECTROCARDIOGRAM REPORT: CPT | Performed by: INTERNAL MEDICINE

## 2025-01-31 NOTE — PAT
An arrival time for procedure was not provided during PAT visit. If patient had any questions or concerns about their arrival time, they were instructed to contact their surgeon/physician.  Additionally, if the patient referred to an arrival time that was acquired from their my chart account, patient was encouraged to verify that time with their surgeon/physician. Arrival times are NOT provided in Pre Admission Testing Department.    Per Anesthesia Request, patient instructed not to take their ACE/ARB medications on the AM of surgery.    Patient denies any current skin issues.     Patient to apply Chlorhexadine wipes  to surgical area (as instructed) the night before procedure and the AM of procedure. Wipes provided.    Patient instructed to drink 20 ounces of Gatorade or Gatorlyte (if diabetic) and it needs to be completed 1 hour (for Main OR patients) or 2 hours (scheduled  section & [facility] patients) before given arrival time for procedure (NO RED Gatorade and NO Gatorade Zero).    Patient verbalized understanding.    Patient viewed general PAT education video as instructed in their preoperative information received from their surgeon.  Patient stated the general PAT education video was viewed in its entirety and survey completed.  Copies of PAT general education handouts (Incentive Spirometry, Meds to Beds Program, Patient Belongings, Pre-op skin preparation instructions, Blood Glucose testing, Visitor policy, Surgery FAQ, Code H) distributed to patient if not printed. Education related to the PAT pass and skin preparation for surgery (if applicable) completed in PAT as a reinforcement to PAT education video. Patient instructed to return PAT pass provided today as well as completed skin preparation sheet (if applicable) on the day of procedure.     Additionally if patient had not viewed video yet but intended to view it at home or in our waiting area, then referred them to the handout with QR code/link  provided during PAT visit.  Encouraged patient/family to read PAT general education handouts thoroughly and notify PAT staff with any questions or concerns. Patient verbalized understanding of all information and priority content.

## 2025-02-01 LAB — MRSA SPEC QL CULT: NORMAL

## 2025-02-14 ENCOUNTER — PRE-ADMISSION TESTING (OUTPATIENT)
Dept: PREADMISSION TESTING | Facility: HOSPITAL | Age: 40
End: 2025-02-14
Payer: COMMERCIAL

## 2025-02-14 LAB
DEPRECATED RDW RBC AUTO: 46.2 FL (ref 37–54)
ERYTHROCYTE [DISTWIDTH] IN BLOOD BY AUTOMATED COUNT: 13.9 % (ref 12.3–15.4)
HCT VFR BLD AUTO: 44.9 % (ref 37.5–51)
HGB BLD-MCNC: 14.1 G/DL (ref 13–17.7)
MCH RBC QN AUTO: 28.4 PG (ref 26.6–33)
MCHC RBC AUTO-ENTMCNC: 31.4 G/DL (ref 31.5–35.7)
MCV RBC AUTO: 90.5 FL (ref 79–97)
PLATELET # BLD AUTO: 265 10*3/MM3 (ref 140–450)
PMV BLD AUTO: 9.7 FL (ref 6–12)
POTASSIUM SERPL-SCNC: 4.5 MMOL/L (ref 3.5–5.2)
RBC # BLD AUTO: 4.96 10*6/MM3 (ref 4.14–5.8)
WBC NRBC COR # BLD AUTO: 8.55 10*3/MM3 (ref 3.4–10.8)

## 2025-02-14 PROCEDURE — 84132 ASSAY OF SERUM POTASSIUM: CPT

## 2025-02-14 PROCEDURE — 85027 COMPLETE CBC AUTOMATED: CPT

## 2025-02-14 PROCEDURE — 36415 COLL VENOUS BLD VENIPUNCTURE: CPT

## 2025-02-18 ENCOUNTER — ANESTHESIA (OUTPATIENT)
Dept: PERIOP | Facility: HOSPITAL | Age: 40
End: 2025-02-18
Payer: COMMERCIAL

## 2025-02-18 ENCOUNTER — HOSPITAL ENCOUNTER (INPATIENT)
Facility: HOSPITAL | Age: 40
LOS: 1 days | Discharge: HOME | End: 2025-02-19
Attending: SURGERY | Admitting: SURGERY
Payer: COMMERCIAL

## 2025-02-18 ENCOUNTER — ANESTHESIA EVENT CONVERTED (OUTPATIENT)
Dept: ANESTHESIOLOGY | Facility: HOSPITAL | Age: 40
End: 2025-02-18
Payer: COMMERCIAL

## 2025-02-18 ENCOUNTER — ANESTHESIA EVENT (OUTPATIENT)
Dept: PERIOP | Facility: HOSPITAL | Age: 40
End: 2025-02-18
Payer: COMMERCIAL

## 2025-02-18 DIAGNOSIS — E66.01 MORBID OBESITY WITH BMI OF 60.0-69.9, ADULT: ICD-10-CM

## 2025-02-18 PROCEDURE — 25010000002 THIAMINE HCL 200 MG/2ML SOLUTION: Performed by: SURGERY

## 2025-02-18 PROCEDURE — 25010000002 SUGAMMADEX 200 MG/2ML SOLUTION: Performed by: ANESTHESIOLOGY

## 2025-02-18 PROCEDURE — 25010000002 ENOXAPARIN PER 10 MG: Performed by: SURGERY

## 2025-02-18 PROCEDURE — 25010000002 DROPERIDOL PER 5 MG

## 2025-02-18 PROCEDURE — 25810000003 SODIUM CHLORIDE 0.9 % SOLUTION: Performed by: SURGERY

## 2025-02-18 PROCEDURE — 25010000002 ONDANSETRON PER 1 MG: Performed by: ANESTHESIOLOGY

## 2025-02-18 PROCEDURE — 43775 LAP SLEEVE GASTRECTOMY: CPT | Performed by: PHYSICIAN ASSISTANT

## 2025-02-18 PROCEDURE — 25810000003 LACTATED RINGERS PER 1000 ML: Performed by: ANESTHESIOLOGY

## 2025-02-18 PROCEDURE — 25010000002 FENTANYL CITRATE (PF) 50 MCG/ML SOLUTION: Performed by: ANESTHESIOLOGY

## 2025-02-18 PROCEDURE — 25010000002 GLUCAGON (RDNA) PER 1 MG: Performed by: ANESTHESIOLOGY

## 2025-02-18 PROCEDURE — 43775 LAP SLEEVE GASTRECTOMY: CPT | Performed by: SURGERY

## 2025-02-18 PROCEDURE — 88307 TISSUE EXAM BY PATHOLOGIST: CPT | Performed by: SURGERY

## 2025-02-18 PROCEDURE — 25010000002 BUPIVACAINE (PF) 0.25 % SOLUTION: Performed by: ANESTHESIOLOGY

## 2025-02-18 PROCEDURE — 25010000002 AMISULPRIDE (ANTIEMETIC) 5 MG/2ML SOLUTION: Performed by: ANESTHESIOLOGY

## 2025-02-18 PROCEDURE — 25010000002 PROPOFOL 10 MG/ML EMULSION: Performed by: ANESTHESIOLOGY

## 2025-02-18 PROCEDURE — 25010000002 CEFAZOLIN 3 G RECONSTITUTED SOLUTION 1 EACH VIAL: Performed by: SURGERY

## 2025-02-18 PROCEDURE — 8E0W4CZ ROBOTIC ASSISTED PROCEDURE OF TRUNK REGION, PERCUTANEOUS ENDOSCOPIC APPROACH: ICD-10-PCS | Performed by: SURGERY

## 2025-02-18 PROCEDURE — 25010000002 HYDROMORPHONE 1 MG/ML SOLUTION

## 2025-02-18 PROCEDURE — 0DB64Z3 EXCISION OF STOMACH, PERCUTANEOUS ENDOSCOPIC APPROACH, VERTICAL: ICD-10-PCS | Performed by: SURGERY

## 2025-02-18 PROCEDURE — 25010000002 LIDOCAINE PF 1% 1 % SOLUTION: Performed by: ANESTHESIOLOGY

## 2025-02-18 PROCEDURE — 25010000002 DEXAMETHASONE SODIUM PHOSPHATE 10 MG/ML SOLUTION: Performed by: ANESTHESIOLOGY

## 2025-02-18 PROCEDURE — 25010000002 HYDRALAZINE PER 20 MG: Performed by: SURGERY

## 2025-02-18 PROCEDURE — 25010000002 DEXAMETHASONE PER 1 MG: Performed by: ANESTHESIOLOGY

## 2025-02-18 PROCEDURE — 25010000002 ONDANSETRON PER 1 MG: Performed by: SURGERY

## 2025-02-18 PROCEDURE — 25010000002 FENTANYL CITRATE (PF) 50 MCG/ML SOLUTION

## 2025-02-18 PROCEDURE — 25010000002 HYDROMORPHONE PER 4 MG: Performed by: SURGERY

## 2025-02-18 DEVICE — STAPLER 60 RELOAD WHITE
Type: IMPLANTABLE DEVICE | Site: STOMACH | Status: FUNCTIONAL
Brand: SUREFORM

## 2025-02-18 DEVICE — STAPLER 60 RELOAD BLUE
Type: IMPLANTABLE DEVICE | Site: STOMACH | Status: FUNCTIONAL
Brand: SUREFORM

## 2025-02-18 RX ORDER — IPRATROPIUM BROMIDE AND ALBUTEROL SULFATE 2.5; .5 MG/3ML; MG/3ML
3 SOLUTION RESPIRATORY (INHALATION) ONCE AS NEEDED
Status: DISCONTINUED | OUTPATIENT
Start: 2025-02-18 | End: 2025-02-18 | Stop reason: HOSPADM

## 2025-02-18 RX ORDER — METOCLOPRAMIDE HYDROCHLORIDE 5 MG/ML
10 INJECTION INTRAMUSCULAR; INTRAVENOUS EVERY 6 HOURS PRN
Status: DISCONTINUED | OUTPATIENT
Start: 2025-02-18 | End: 2025-02-19 | Stop reason: HOSPADM

## 2025-02-18 RX ORDER — PROCHLORPERAZINE MALEATE 10 MG
10 TABLET ORAL EVERY 6 HOURS PRN
Status: DISCONTINUED | OUTPATIENT
Start: 2025-02-18 | End: 2025-02-19 | Stop reason: HOSPADM

## 2025-02-18 RX ORDER — DEXAMETHASONE SODIUM PHOSPHATE 10 MG/ML
INJECTION, SOLUTION INTRAMUSCULAR; INTRAVENOUS
Status: COMPLETED | OUTPATIENT
Start: 2025-02-18 | End: 2025-02-18

## 2025-02-18 RX ORDER — SODIUM CHLORIDE 0.9 % (FLUSH) 0.9 %
1-10 SYRINGE (ML) INJECTION AS NEEDED
Status: DISCONTINUED | OUTPATIENT
Start: 2025-02-18 | End: 2025-02-19 | Stop reason: HOSPADM

## 2025-02-18 RX ORDER — SODIUM CHLORIDE AND POTASSIUM CHLORIDE 150; 450 MG/100ML; MG/100ML
100 INJECTION, SOLUTION INTRAVENOUS CONTINUOUS
Status: DISCONTINUED | OUTPATIENT
Start: 2025-02-19 | End: 2025-02-19 | Stop reason: HOSPADM

## 2025-02-18 RX ORDER — ONDANSETRON 2 MG/ML
4 INJECTION INTRAMUSCULAR; INTRAVENOUS EVERY 6 HOURS PRN
Status: DISCONTINUED | OUTPATIENT
Start: 2025-02-18 | End: 2025-02-19 | Stop reason: HOSPADM

## 2025-02-18 RX ORDER — FENTANYL CITRATE 50 UG/ML
INJECTION, SOLUTION INTRAMUSCULAR; INTRAVENOUS AS NEEDED
Status: DISCONTINUED | OUTPATIENT
Start: 2025-02-18 | End: 2025-02-18 | Stop reason: SURG

## 2025-02-18 RX ORDER — DEXMEDETOMIDINE HYDROCHLORIDE 4 UG/ML
INJECTION, SOLUTION INTRAVENOUS AS NEEDED
Status: DISCONTINUED | OUTPATIENT
Start: 2025-02-18 | End: 2025-02-18 | Stop reason: SURG

## 2025-02-18 RX ORDER — DEXAMETHASONE SODIUM PHOSPHATE 4 MG/ML
INJECTION, SOLUTION INTRA-ARTICULAR; INTRALESIONAL; INTRAMUSCULAR; INTRAVENOUS; SOFT TISSUE AS NEEDED
Status: DISCONTINUED | OUTPATIENT
Start: 2025-02-18 | End: 2025-02-18 | Stop reason: SURG

## 2025-02-18 RX ORDER — PROMETHAZINE HYDROCHLORIDE 25 MG/1
12.5 TABLET ORAL EVERY 6 HOURS PRN
Status: DISCONTINUED | OUTPATIENT
Start: 2025-02-18 | End: 2025-02-19 | Stop reason: HOSPADM

## 2025-02-18 RX ORDER — LABETALOL HYDROCHLORIDE 5 MG/ML
5 INJECTION, SOLUTION INTRAVENOUS
Status: DISCONTINUED | OUTPATIENT
Start: 2025-02-18 | End: 2025-02-18 | Stop reason: HOSPADM

## 2025-02-18 RX ORDER — ACETAMINOPHEN 160 MG/5ML
1000 SOLUTION ORAL EVERY 8 HOURS SCHEDULED
Status: DISCONTINUED | OUTPATIENT
Start: 2025-02-18 | End: 2025-02-19 | Stop reason: HOSPADM

## 2025-02-18 RX ORDER — GABAPENTIN 250 MG/5ML
100 SOLUTION ORAL 3 TIMES DAILY
Status: DISCONTINUED | OUTPATIENT
Start: 2025-02-18 | End: 2025-02-19 | Stop reason: HOSPADM

## 2025-02-18 RX ORDER — IBUPROFEN 600 MG/1
TABLET ORAL AS NEEDED
Status: DISCONTINUED | OUTPATIENT
Start: 2025-02-18 | End: 2025-02-18 | Stop reason: SURG

## 2025-02-18 RX ORDER — ONDANSETRON 4 MG/1
4 TABLET, ORALLY DISINTEGRATING ORAL EVERY 6 HOURS PRN
Status: DISCONTINUED | OUTPATIENT
Start: 2025-02-18 | End: 2025-02-19 | Stop reason: HOSPADM

## 2025-02-18 RX ORDER — NALOXONE HCL 0.4 MG/ML
0.1 VIAL (ML) INJECTION
Status: DISCONTINUED | OUTPATIENT
Start: 2025-02-18 | End: 2025-02-19 | Stop reason: HOSPADM

## 2025-02-18 RX ORDER — SODIUM CHLORIDE 0.9 % (FLUSH) 0.9 %
10 SYRINGE (ML) INJECTION EVERY 12 HOURS SCHEDULED
Status: DISCONTINUED | OUTPATIENT
Start: 2025-02-18 | End: 2025-02-18 | Stop reason: HOSPADM

## 2025-02-18 RX ORDER — PROCHLORPERAZINE 25 MG
25 SUPPOSITORY, RECTAL RECTAL EVERY 12 HOURS PRN
Status: DISCONTINUED | OUTPATIENT
Start: 2025-02-18 | End: 2025-02-19 | Stop reason: HOSPADM

## 2025-02-18 RX ORDER — FENTANYL CITRATE 50 UG/ML
50 INJECTION, SOLUTION INTRAMUSCULAR; INTRAVENOUS
Status: DISCONTINUED | OUTPATIENT
Start: 2025-02-18 | End: 2025-02-18 | Stop reason: HOSPADM

## 2025-02-18 RX ORDER — GABAPENTIN 300 MG/1
600 CAPSULE ORAL ONCE
Status: COMPLETED | OUTPATIENT
Start: 2025-02-18 | End: 2025-02-18

## 2025-02-18 RX ORDER — PROMETHAZINE HYDROCHLORIDE 25 MG/1
25 TABLET ORAL ONCE AS NEEDED
Status: DISCONTINUED | OUTPATIENT
Start: 2025-02-18 | End: 2025-02-18 | Stop reason: HOSPADM

## 2025-02-18 RX ORDER — SODIUM CHLORIDE 9 MG/ML
40 INJECTION, SOLUTION INTRAVENOUS AS NEEDED
Status: DISCONTINUED | OUTPATIENT
Start: 2025-02-18 | End: 2025-02-19 | Stop reason: HOSPADM

## 2025-02-18 RX ORDER — ENOXAPARIN SODIUM 100 MG/ML
INJECTION SUBCUTANEOUS AS NEEDED
Status: DISCONTINUED | OUTPATIENT
Start: 2025-02-18 | End: 2025-02-18 | Stop reason: HOSPADM

## 2025-02-18 RX ORDER — PROPOFOL 10 MG/ML
VIAL (ML) INTRAVENOUS AS NEEDED
Status: DISCONTINUED | OUTPATIENT
Start: 2025-02-18 | End: 2025-02-18 | Stop reason: SURG

## 2025-02-18 RX ORDER — SCOPOLAMINE 1 MG/3D
1 PATCH, EXTENDED RELEASE TRANSDERMAL CONTINUOUS
Status: DISCONTINUED | OUTPATIENT
Start: 2025-02-18 | End: 2025-02-19 | Stop reason: HOSPADM

## 2025-02-18 RX ORDER — CHLORHEXIDINE GLUCONATE ORAL RINSE 1.2 MG/ML
15 SOLUTION DENTAL
Status: COMPLETED | OUTPATIENT
Start: 2025-02-18 | End: 2025-02-18

## 2025-02-18 RX ORDER — HYDRALAZINE HYDROCHLORIDE 20 MG/ML
10 INJECTION INTRAMUSCULAR; INTRAVENOUS
Status: DISCONTINUED | OUTPATIENT
Start: 2025-02-18 | End: 2025-02-19 | Stop reason: HOSPADM

## 2025-02-18 RX ORDER — PANTOPRAZOLE SODIUM 40 MG/10ML
40 INJECTION, POWDER, LYOPHILIZED, FOR SOLUTION INTRAVENOUS ONCE
Status: DISCONTINUED | OUTPATIENT
Start: 2025-02-18 | End: 2025-02-19

## 2025-02-18 RX ORDER — PROMETHAZINE HYDROCHLORIDE 12.5 MG/1
12.5 SUPPOSITORY RECTAL EVERY 6 HOURS PRN
Status: DISCONTINUED | OUTPATIENT
Start: 2025-02-18 | End: 2025-02-19 | Stop reason: HOSPADM

## 2025-02-18 RX ORDER — FAMOTIDINE 10 MG/ML
20 INJECTION, SOLUTION INTRAVENOUS ONCE
Status: DISCONTINUED | OUTPATIENT
Start: 2025-02-18 | End: 2025-02-18 | Stop reason: HOSPADM

## 2025-02-18 RX ORDER — DROPERIDOL 2.5 MG/ML
0.62 INJECTION, SOLUTION INTRAMUSCULAR; INTRAVENOUS ONCE
Status: COMPLETED | OUTPATIENT
Start: 2025-02-18 | End: 2025-02-18

## 2025-02-18 RX ORDER — SODIUM CHLORIDE 9 MG/ML
150 INJECTION, SOLUTION INTRAVENOUS CONTINUOUS
Status: DISCONTINUED | OUTPATIENT
Start: 2025-02-18 | End: 2025-02-19 | Stop reason: HOSPADM

## 2025-02-18 RX ORDER — OXYCODONE AND ACETAMINOPHEN 7.5; 325 MG/1; MG/1
1 TABLET ORAL EVERY 4 HOURS PRN
Status: DISCONTINUED | OUTPATIENT
Start: 2025-02-18 | End: 2025-02-18 | Stop reason: HOSPADM

## 2025-02-18 RX ORDER — FENTANYL CITRATE 50 UG/ML
INJECTION, SOLUTION INTRAMUSCULAR; INTRAVENOUS
Status: COMPLETED
Start: 2025-02-18 | End: 2025-02-18

## 2025-02-18 RX ORDER — FAMOTIDINE 20 MG/1
20 TABLET, FILM COATED ORAL ONCE
Status: DISCONTINUED | OUTPATIENT
Start: 2025-02-18 | End: 2025-02-18 | Stop reason: HOSPADM

## 2025-02-18 RX ORDER — HYDRALAZINE HYDROCHLORIDE 20 MG/ML
5 INJECTION INTRAMUSCULAR; INTRAVENOUS
Status: DISCONTINUED | OUTPATIENT
Start: 2025-02-18 | End: 2025-02-18 | Stop reason: HOSPADM

## 2025-02-18 RX ORDER — LIDOCAINE HYDROCHLORIDE 10 MG/ML
0.5 INJECTION, SOLUTION EPIDURAL; INFILTRATION; INTRACAUDAL; PERINEURAL ONCE
Status: COMPLETED | OUTPATIENT
Start: 2025-02-18 | End: 2025-02-18

## 2025-02-18 RX ORDER — ROCURONIUM BROMIDE 10 MG/ML
INJECTION, SOLUTION INTRAVENOUS AS NEEDED
Status: DISCONTINUED | OUTPATIENT
Start: 2025-02-18 | End: 2025-02-18 | Stop reason: SURG

## 2025-02-18 RX ORDER — SODIUM CHLORIDE, SODIUM LACTATE, POTASSIUM CHLORIDE, CALCIUM CHLORIDE 600; 310; 30; 20 MG/100ML; MG/100ML; MG/100ML; MG/100ML
150 INJECTION, SOLUTION INTRAVENOUS CONTINUOUS
Status: ACTIVE | OUTPATIENT
Start: 2025-02-18 | End: 2025-02-19

## 2025-02-18 RX ORDER — LABETALOL HYDROCHLORIDE 5 MG/ML
10 INJECTION, SOLUTION INTRAVENOUS
Status: DISCONTINUED | OUTPATIENT
Start: 2025-02-18 | End: 2025-02-19 | Stop reason: HOSPADM

## 2025-02-18 RX ORDER — HYDROMORPHONE HYDROCHLORIDE 1 MG/ML
0.5 INJECTION, SOLUTION INTRAMUSCULAR; INTRAVENOUS; SUBCUTANEOUS
Status: DISCONTINUED | OUTPATIENT
Start: 2025-02-18 | End: 2025-02-18 | Stop reason: HOSPADM

## 2025-02-18 RX ORDER — LIDOCAINE HYDROCHLORIDE 10 MG/ML
0.5 INJECTION, SOLUTION EPIDURAL; INFILTRATION; INTRACAUDAL; PERINEURAL ONCE AS NEEDED
Status: DISCONTINUED | OUTPATIENT
Start: 2025-02-18 | End: 2025-02-18 | Stop reason: HOSPADM

## 2025-02-18 RX ORDER — SODIUM CHLORIDE 9 MG/ML
9 INJECTION, SOLUTION INTRAVENOUS AS NEEDED
Status: DISCONTINUED | OUTPATIENT
Start: 2025-02-18 | End: 2025-02-18 | Stop reason: HOSPADM

## 2025-02-18 RX ORDER — SODIUM CHLORIDE 0.9 % (FLUSH) 0.9 %
10 SYRINGE (ML) INJECTION EVERY 12 HOURS SCHEDULED
Status: DISCONTINUED | OUTPATIENT
Start: 2025-02-18 | End: 2025-02-19 | Stop reason: HOSPADM

## 2025-02-18 RX ORDER — SODIUM CHLORIDE 0.9 % (FLUSH) 0.9 %
10 SYRINGE (ML) INJECTION AS NEEDED
Status: DISCONTINUED | OUTPATIENT
Start: 2025-02-18 | End: 2025-02-18 | Stop reason: HOSPADM

## 2025-02-18 RX ORDER — ENOXAPARIN SODIUM 100 MG/ML
60 INJECTION SUBCUTANEOUS ONCE
Status: DISCONTINUED | OUTPATIENT
Start: 2025-02-18 | End: 2025-02-19

## 2025-02-18 RX ORDER — CYANOCOBALAMIN 1000 UG/ML
1000 INJECTION, SOLUTION INTRAMUSCULAR; SUBCUTANEOUS ONCE
Status: COMPLETED | OUTPATIENT
Start: 2025-02-19 | End: 2025-02-19

## 2025-02-18 RX ORDER — LIDOCAINE HYDROCHLORIDE 10 MG/ML
INJECTION, SOLUTION EPIDURAL; INFILTRATION; INTRACAUDAL; PERINEURAL AS NEEDED
Status: DISCONTINUED | OUTPATIENT
Start: 2025-02-18 | End: 2025-02-18 | Stop reason: SURG

## 2025-02-18 RX ORDER — PANTOPRAZOLE SODIUM 40 MG/1
40 TABLET, DELAYED RELEASE ORAL EVERY MORNING
Status: DISCONTINUED | OUTPATIENT
Start: 2025-02-19 | End: 2025-02-19

## 2025-02-18 RX ORDER — PANTOPRAZOLE SODIUM 40 MG/10ML
40 INJECTION, POWDER, LYOPHILIZED, FOR SOLUTION INTRAVENOUS ONCE
Status: COMPLETED | OUTPATIENT
Start: 2025-02-18 | End: 2025-02-18

## 2025-02-18 RX ORDER — SODIUM CHLORIDE 0.9 % (FLUSH) 0.9 %
3-10 SYRINGE (ML) INJECTION AS NEEDED
Status: DISCONTINUED | OUTPATIENT
Start: 2025-02-18 | End: 2025-02-18 | Stop reason: HOSPADM

## 2025-02-18 RX ORDER — METOCLOPRAMIDE 10 MG/1
10 TABLET ORAL EVERY 6 HOURS PRN
Status: DISCONTINUED | OUTPATIENT
Start: 2025-02-18 | End: 2025-02-19 | Stop reason: HOSPADM

## 2025-02-18 RX ORDER — GABAPENTIN 100 MG/1
100 CAPSULE ORAL 3 TIMES DAILY
Status: DISCONTINUED | OUTPATIENT
Start: 2025-02-18 | End: 2025-02-19 | Stop reason: HOSPADM

## 2025-02-18 RX ORDER — SODIUM CHLORIDE 0.9 % (FLUSH) 0.9 %
3 SYRINGE (ML) INJECTION EVERY 12 HOURS SCHEDULED
Status: DISCONTINUED | OUTPATIENT
Start: 2025-02-18 | End: 2025-02-18 | Stop reason: HOSPADM

## 2025-02-18 RX ORDER — DIPHENHYDRAMINE HYDROCHLORIDE 50 MG/ML
25 INJECTION INTRAMUSCULAR; INTRAVENOUS EVERY 4 HOURS PRN
Status: DISCONTINUED | OUTPATIENT
Start: 2025-02-18 | End: 2025-02-19 | Stop reason: HOSPADM

## 2025-02-18 RX ORDER — ACETAMINOPHEN 500 MG
1000 TABLET ORAL ONCE
Status: COMPLETED | OUTPATIENT
Start: 2025-02-18 | End: 2025-02-18

## 2025-02-18 RX ORDER — LOSARTAN POTASSIUM 25 MG/1
25 TABLET ORAL DAILY
Status: DISCONTINUED | OUTPATIENT
Start: 2025-02-18 | End: 2025-02-19 | Stop reason: HOSPADM

## 2025-02-18 RX ORDER — ALPRAZOLAM 0.25 MG
0.25 TABLET ORAL 2 TIMES DAILY PRN
Status: DISCONTINUED | OUTPATIENT
Start: 2025-02-18 | End: 2025-02-19 | Stop reason: HOSPADM

## 2025-02-18 RX ORDER — MAGNESIUM HYDROXIDE 1200 MG/15ML
LIQUID ORAL AS NEEDED
Status: DISCONTINUED | OUTPATIENT
Start: 2025-02-18 | End: 2025-02-18 | Stop reason: HOSPADM

## 2025-02-18 RX ORDER — HYDROMORPHONE HYDROCHLORIDE 2 MG/1
2 TABLET ORAL EVERY 4 HOURS PRN
Status: DISCONTINUED | OUTPATIENT
Start: 2025-02-18 | End: 2025-02-19 | Stop reason: HOSPADM

## 2025-02-18 RX ORDER — BUPIVACAINE HYDROCHLORIDE 2.5 MG/ML
INJECTION, SOLUTION EPIDURAL; INFILTRATION; INTRACAUDAL
Status: COMPLETED | OUTPATIENT
Start: 2025-02-18 | End: 2025-02-18

## 2025-02-18 RX ORDER — ONDANSETRON 2 MG/ML
INJECTION INTRAMUSCULAR; INTRAVENOUS AS NEEDED
Status: DISCONTINUED | OUTPATIENT
Start: 2025-02-18 | End: 2025-02-18 | Stop reason: SURG

## 2025-02-18 RX ORDER — PROCHLORPERAZINE EDISYLATE 5 MG/ML
10 INJECTION INTRAMUSCULAR; INTRAVENOUS EVERY 6 HOURS PRN
Status: DISCONTINUED | OUTPATIENT
Start: 2025-02-18 | End: 2025-02-19 | Stop reason: HOSPADM

## 2025-02-18 RX ORDER — HYDROCODONE BITARTRATE AND ACETAMINOPHEN 5; 325 MG/1; MG/1
1 TABLET ORAL ONCE AS NEEDED
Status: DISCONTINUED | OUTPATIENT
Start: 2025-02-18 | End: 2025-02-18 | Stop reason: HOSPADM

## 2025-02-18 RX ORDER — PROMETHAZINE HYDROCHLORIDE 25 MG/1
25 SUPPOSITORY RECTAL ONCE AS NEEDED
Status: DISCONTINUED | OUTPATIENT
Start: 2025-02-18 | End: 2025-02-18 | Stop reason: HOSPADM

## 2025-02-18 RX ORDER — NALOXONE HCL 0.4 MG/ML
0.4 VIAL (ML) INJECTION AS NEEDED
Status: DISCONTINUED | OUTPATIENT
Start: 2025-02-18 | End: 2025-02-18 | Stop reason: HOSPADM

## 2025-02-18 RX ORDER — ACETAMINOPHEN 500 MG
1000 TABLET ORAL EVERY 8 HOURS SCHEDULED
Status: DISCONTINUED | OUTPATIENT
Start: 2025-02-18 | End: 2025-02-19 | Stop reason: HOSPADM

## 2025-02-18 RX ORDER — MIDAZOLAM HYDROCHLORIDE 1 MG/ML
1 INJECTION, SOLUTION INTRAMUSCULAR; INTRAVENOUS
Status: DISCONTINUED | OUTPATIENT
Start: 2025-02-18 | End: 2025-02-18 | Stop reason: HOSPADM

## 2025-02-18 RX ORDER — SIMETHICONE 80 MG
80 TABLET,CHEWABLE ORAL 4 TIMES DAILY PRN
Status: DISCONTINUED | OUTPATIENT
Start: 2025-02-18 | End: 2025-02-19 | Stop reason: HOSPADM

## 2025-02-18 RX ORDER — DROPERIDOL 2.5 MG/ML
INJECTION, SOLUTION INTRAMUSCULAR; INTRAVENOUS
Status: COMPLETED
Start: 2025-02-18 | End: 2025-02-18

## 2025-02-18 RX ORDER — THIAMINE HYDROCHLORIDE 100 MG/ML
100 INJECTION, SOLUTION INTRAMUSCULAR; INTRAVENOUS ONCE
Status: COMPLETED | OUTPATIENT
Start: 2025-02-18 | End: 2025-02-18

## 2025-02-18 RX ORDER — FIBRINOGEN HUMAN, HUMAN THROMBIN 4 ML
KIT TOPICAL AS NEEDED
Status: DISCONTINUED | OUTPATIENT
Start: 2025-02-18 | End: 2025-02-18 | Stop reason: HOSPADM

## 2025-02-18 RX ORDER — SODIUM CHLORIDE, SODIUM LACTATE, POTASSIUM CHLORIDE, CALCIUM CHLORIDE 600; 310; 30; 20 MG/100ML; MG/100ML; MG/100ML; MG/100ML
9 INJECTION, SOLUTION INTRAVENOUS CONTINUOUS
Status: ACTIVE | OUTPATIENT
Start: 2025-02-19 | End: 2025-02-19

## 2025-02-18 RX ORDER — TRAZODONE HYDROCHLORIDE 50 MG/1
50 TABLET ORAL NIGHTLY
Status: DISCONTINUED | OUTPATIENT
Start: 2025-02-18 | End: 2025-02-19 | Stop reason: HOSPADM

## 2025-02-18 RX ORDER — ONDANSETRON 2 MG/ML
4 INJECTION INTRAMUSCULAR; INTRAVENOUS ONCE AS NEEDED
Status: DISCONTINUED | OUTPATIENT
Start: 2025-02-18 | End: 2025-02-18 | Stop reason: HOSPADM

## 2025-02-18 RX ORDER — HYDROMORPHONE HYDROCHLORIDE 1 MG/ML
0.5 INJECTION, SOLUTION INTRAMUSCULAR; INTRAVENOUS; SUBCUTANEOUS
Status: DISCONTINUED | OUTPATIENT
Start: 2025-02-18 | End: 2025-02-19 | Stop reason: HOSPADM

## 2025-02-18 RX ORDER — OXYCODONE HYDROCHLORIDE 5 MG/1
5 TABLET ORAL EVERY 6 HOURS PRN
Status: DISCONTINUED | OUTPATIENT
Start: 2025-02-18 | End: 2025-02-19 | Stop reason: HOSPADM

## 2025-02-18 RX ORDER — SCOPOLAMINE 1 MG/3D
1 PATCH, EXTENDED RELEASE TRANSDERMAL ONCE
Status: DISCONTINUED | OUTPATIENT
Start: 2025-02-18 | End: 2025-02-19 | Stop reason: HOSPADM

## 2025-02-18 RX ORDER — SODIUM CHLORIDE, SODIUM LACTATE, POTASSIUM CHLORIDE, CALCIUM CHLORIDE 600; 310; 30; 20 MG/100ML; MG/100ML; MG/100ML; MG/100ML
9 INJECTION, SOLUTION INTRAVENOUS CONTINUOUS
Status: ACTIVE | OUTPATIENT
Start: 2025-02-18 | End: 2025-02-19

## 2025-02-18 RX ADMIN — THIAMINE HYDROCHLORIDE 100 MG: 100 INJECTION, SOLUTION INTRAMUSCULAR; INTRAVENOUS at 14:35

## 2025-02-18 RX ADMIN — ROCURONIUM BROMIDE 100 MG: 10 INJECTION INTRAVENOUS at 07:31

## 2025-02-18 RX ADMIN — GABAPENTIN 600 MG: 300 CAPSULE ORAL at 06:11

## 2025-02-18 RX ADMIN — FENTANYL CITRATE 100 MCG: 50 INJECTION, SOLUTION INTRAMUSCULAR; INTRAVENOUS at 08:44

## 2025-02-18 RX ADMIN — ACETAMINOPHEN 1000 MG: 500 TABLET ORAL at 14:35

## 2025-02-18 RX ADMIN — FENTANYL CITRATE 50 MCG: 50 INJECTION, SOLUTION INTRAMUSCULAR; INTRAVENOUS at 09:47

## 2025-02-18 RX ADMIN — DEXAMETHASONE SODIUM PHOSPHATE 4 MG: 10 INJECTION INTRAMUSCULAR; INTRAVENOUS at 07:40

## 2025-02-18 RX ADMIN — ROCURONIUM BROMIDE 30 MG: 10 INJECTION INTRAVENOUS at 08:26

## 2025-02-18 RX ADMIN — GLUCAGON 1 MG: KIT at 08:19

## 2025-02-18 RX ADMIN — ONDANSETRON 4 MG: 2 INJECTION INTRAMUSCULAR; INTRAVENOUS at 08:34

## 2025-02-18 RX ADMIN — TRAZODONE HYDROCHLORIDE 50 MG: 50 TABLET ORAL at 21:15

## 2025-02-18 RX ADMIN — HYDROMORPHONE HYDROCHLORIDE 0.5 MG: 1 INJECTION, SOLUTION INTRAMUSCULAR; INTRAVENOUS; SUBCUTANEOUS at 09:23

## 2025-02-18 RX ADMIN — ONDANSETRON 4 MG: 2 INJECTION INTRAMUSCULAR; INTRAVENOUS at 10:57

## 2025-02-18 RX ADMIN — DEXMEDETOMIDINE HYDROCHLORIDE IN 0.9% SODIUM CHLORIDE 4 MCG: 4 INJECTION INTRAVENOUS at 08:23

## 2025-02-18 RX ADMIN — ACETAMINOPHEN 1000 MG: 500 TABLET ORAL at 21:15

## 2025-02-18 RX ADMIN — CHLORHEXIDINE GLUCONATE 15 ML: 1.2 SOLUTION ORAL at 06:12

## 2025-02-18 RX ADMIN — SODIUM CHLORIDE 3 G: 900 INJECTION INTRAVENOUS at 07:36

## 2025-02-18 RX ADMIN — GABAPENTIN 100 MG: 100 CAPSULE ORAL at 14:35

## 2025-02-18 RX ADMIN — DEXMEDETOMIDINE HYDROCHLORIDE IN 0.9% SODIUM CHLORIDE 4 MCG: 4 INJECTION INTRAVENOUS at 08:29

## 2025-02-18 RX ADMIN — FENTANYL CITRATE 50 MCG: 50 INJECTION, SOLUTION INTRAMUSCULAR; INTRAVENOUS at 09:13

## 2025-02-18 RX ADMIN — Medication 10 ML: at 21:15

## 2025-02-18 RX ADMIN — HYDRALAZINE HYDROCHLORIDE 10 MG: 20 INJECTION INTRAMUSCULAR; INTRAVENOUS at 16:01

## 2025-02-18 RX ADMIN — PROPOFOL 25 MCG/KG/MIN: 10 INJECTION, EMULSION INTRAVENOUS at 07:35

## 2025-02-18 RX ADMIN — PANTOPRAZOLE SODIUM 40 MG: 40 INJECTION, POWDER, FOR SOLUTION INTRAVENOUS at 06:12

## 2025-02-18 RX ADMIN — GABAPENTIN 100 MG: 100 CAPSULE ORAL at 21:15

## 2025-02-18 RX ADMIN — DEXMEDETOMIDINE HYDROCHLORIDE IN 0.9% SODIUM CHLORIDE 4 MCG: 4 INJECTION INTRAVENOUS at 08:26

## 2025-02-18 RX ADMIN — SUGAMMADEX 400 MG: 100 INJECTION, SOLUTION INTRAVENOUS at 08:41

## 2025-02-18 RX ADMIN — PROPOFOL INJECTABLE EMULSION 300 MG: 10 INJECTION, EMULSION INTRAVENOUS at 07:30

## 2025-02-18 RX ADMIN — LIDOCAINE HYDROCHLORIDE 100 MG: 10 INJECTION, SOLUTION EPIDURAL; INFILTRATION; INTRACAUDAL; PERINEURAL at 07:30

## 2025-02-18 RX ADMIN — SODIUM CHLORIDE 150 ML/HR: 9 INJECTION, SOLUTION INTRAVENOUS at 21:15

## 2025-02-18 RX ADMIN — DROPERIDOL 0.62 MG: 2.5 INJECTION, SOLUTION INTRAMUSCULAR; INTRAVENOUS at 09:37

## 2025-02-18 RX ADMIN — CHLORHEXIDINE GLUCONATE 15 ML: 1.2 SOLUTION ORAL at 06:16

## 2025-02-18 RX ADMIN — SODIUM CHLORIDE 3000 MG: 900 INJECTION INTRAVENOUS at 16:01

## 2025-02-18 RX ADMIN — BUPIVACAINE HYDROCHLORIDE 60 ML: 2.5 INJECTION, SOLUTION EPIDURAL; INFILTRATION; INTRACAUDAL; PERINEURAL at 07:40

## 2025-02-18 RX ADMIN — SODIUM CHLORIDE 150 ML/HR: 9 INJECTION, SOLUTION INTRAVENOUS at 05:55

## 2025-02-18 RX ADMIN — AMISULPRIDE 10 MG: 2.5 INJECTION, SOLUTION INTRAVENOUS at 08:36

## 2025-02-18 RX ADMIN — SODIUM CHLORIDE, POTASSIUM CHLORIDE, SODIUM LACTATE AND CALCIUM CHLORIDE: 600; 310; 30; 20 INJECTION, SOLUTION INTRAVENOUS at 07:26

## 2025-02-18 RX ADMIN — LIDOCAINE HYDROCHLORIDE 0.5 ML: 10 INJECTION, SOLUTION EPIDURAL; INFILTRATION; INTRACAUDAL; PERINEURAL at 05:55

## 2025-02-18 RX ADMIN — HYDROMORPHONE HYDROCHLORIDE 0.5 MG: 1 INJECTION, SOLUTION INTRAMUSCULAR; INTRAVENOUS; SUBCUTANEOUS at 14:35

## 2025-02-18 RX ADMIN — SCOPOLAMINE 1 PATCH: 1.5 PATCH, EXTENDED RELEASE TRANSDERMAL at 06:12

## 2025-02-18 RX ADMIN — LOSARTAN POTASSIUM 25 MG: 25 TABLET, FILM COATED ORAL at 14:35

## 2025-02-18 RX ADMIN — DEXAMETHASONE SODIUM PHOSPHATE 8 MG: 4 INJECTION, SOLUTION INTRAMUSCULAR; INTRAVENOUS at 07:36

## 2025-02-18 RX ADMIN — SODIUM CHLORIDE 150 ML/HR: 9 INJECTION, SOLUTION INTRAVENOUS at 14:40

## 2025-02-18 RX ADMIN — ACETAMINOPHEN 1000 MG: 500 TABLET ORAL at 06:12

## 2025-02-18 NOTE — ANESTHESIA PROCEDURE NOTES
Airway  Urgency: elective    Date/Time: 2/18/2025 7:32 AM  Airway not difficult    General Information and Staff    Patient location during procedure: OR  CRNA/CAA: Elia Asencio CRNA    Indications and Patient Condition  Indications for airway management: airway protection    Preoxygenated: yes  MILS not maintained throughout  Mask difficulty assessment: 2 - vent by mask + OA or adjuvant +/- NMBA    Final Airway Details  Final airway type: endotracheal airway      Successful airway: ETT  Cuffed: yes   Successful intubation technique: video laryngoscopy  Facilitating devices/methods: intubating stylet  Endotracheal tube insertion site: oral  Blade: Saenz  Blade size: 4  ETT size (mm): 7.5  Cormack-Lehane Classification: grade I - full view of glottis  Placement verified by: chest auscultation and capnometry   Cuff volume (mL): 10  Measured from: lips  ETT/EBT  to lips (cm): 22  Number of attempts at approach: 1  Assessment: lips, teeth, and gum same as pre-op and atraumatic intubation    Additional Comments  Negative epigastric sounds, Breath sound equal bilaterally with symmetric chest rise and fall

## 2025-02-18 NOTE — ANESTHESIA PREPROCEDURE EVALUATION
Anesthesia Evaluation                  Airway   Mallampati: III  TM distance: >3 FB  Neck ROM: full  Possible difficult intubation  Dental      Pulmonary    (+) ,shortness of breath, sleep apnea  Cardiovascular     ECG reviewed    (+) hypertension      Neuro/Psych  GI/Hepatic/Renal/Endo    (+) morbid obesity    Musculoskeletal     Abdominal    Substance History      OB/GYN          Other                    Anesthesia Plan    ASA 3     general     (taps)  intravenous induction     Anesthetic plan, risks, benefits, and alternatives have been provided, discussed and informed consent has been obtained with: patient.    Plan discussed with CRNA.    CODE STATUS:

## 2025-02-18 NOTE — ANESTHESIA PROCEDURE NOTES
"Peripheral Block      Patient reassessed immediately prior to procedure    Patient location during procedure: OR  Start time: 2/18/2025 7:40 AM  Reason for block: at surgeon's request and post-op pain management  Performed by  CRNA/CAA: Elia Asencio CRNASRNA: Rachael Pennington SRNA  Preanesthetic Checklist  Completed: patient identified, IV checked, site marked, risks and benefits discussed, surgical consent, monitors and equipment checked, pre-op evaluation and timeout performed  Prep:  Pt Position: supine  Sterile barriers:cap, gloves, mask and washed/disinfected hands  Prep: ChloraPrep  Patient monitoring: blood pressure monitoring, continuous pulse oximetry and EKG  Procedure    Sedation: yes  Performed under: general  Guidance:ultrasound guided  Images:still images obtained, printed/placed on chart    Laterality:Bilateral  Block Type:TAP  Injection Technique:single-shot  Needle Type:short-bevel and echogenic  Needle Gauge:20 G  Resistance on Injection: none    Medications Used: dexamethasone sodium phosphate injection - Injection   4 mg - 2/18/2025 7:40:00 AM  bupivacaine PF (MARCAINE) 0.25 % injection - Injection   60 mL - 2/18/2025 7:40:00 AM      Medications  Comment:Block Injection:  LA dose divided between Right and Left block        Post Assessment  Injection Assessment: negative aspiration for heme, incremental injection and no paresthesia on injection  Patient Tolerance:comfortable throughout block  Complications:no  Additional Notes    Subcostal TAPs    A high-frequency linear transducer, with sterile cover, was placed sub-xiphoid to identify [place]ba, right and left Rectus Abdominus Muscles (FALLON). The transducer was moved either right or left subcostally to identify the FALLON and the Transverse Abdominus Muscle (BONNER). The insertion site was prepped in sterile fashion and then localized with 2-5 ml of 1% Lidocaine. Using ultrasound-guidance, a 20-gauge B-Carias 4\" Ultraplex 360 non-stimulating echogenic " "needle was advanced in plane, from medial to lateral, until the tip of the needle was in the fascial plane between the FALLON and BONNER. 1-3ml of preservative free normal saline was used to hydro-dissect the fascial planes. After the fascial plane was verified, the local anesthetic (LA) was injected. The procedure was repeated on the opposite side for bilateral coverage. Aspiration every 5 ml to prevent intravascular injection. Injection was completed with negative aspiration of blood and negative intravascular injection. Injection pressures were normal with minimal resistance. The subcostal approach to the TAP nerve block ideally anesthetizes the intercostal nerves T6-T9.     Mid-Axillary/Lateral TAPs    A high-frequency linear transducer, with sterile cover, was placed in the midaxillary line between the ASIS and costal margin. The External Oblique Muscle (EOM), Internal Oblique Muscle (IOM), Transverse Abdominus Muscle (BONNER), and Peritoneum were identified. The insertion site was prepped in sterile fashion and then localized with 2-5 ml of 1% Lidocaine. Using ultrasound-guidance, a 20-gauge B-Carias 4\" Ultraplex 360 non-stimulating echogenic needle was advanced in plane, from medial to lateral, until the tip of the needle was in the fascial plane between the IOM and BONNER. 1-3ml of preservative free normal saline was used to hydro-dissect the fascial planes. After the fascial plane was verified, the local anesthetic (LA) was injected. The procedure was repeated on the opposite side for bilateral coverage. Aspiration every 5 ml to prevent intravascular injection. Injection was completed with negative aspiration of blood and negative intravascular injection. Injection pressures were normal with minimal resistance. Midaxillary TAPs should reach intercostal nerves T10- T11 and the subcostal nerve T12.    Performed by: Rachael Pennington SRNA            "

## 2025-02-18 NOTE — PLAN OF CARE
Goal Outcome Evaluation:           Progress: improving  Outcome Evaluation: patient received from PACU after bariatric person  Patient on 2L NC, NSR, full code, wife at bedside, pain controlled with IV painmedication, sleeps if undistrubed,                             Problem: Adult Inpatient Plan of Care  Goal: Plan of Care Review  Outcome: Progressing  Flowsheets (Taken 2/18/2025 1652)  Progress: improving  Outcome Evaluation: patient received from PACU after bariatric person  Patient on 2L NC, NSR, full code, wife at bedside, pain controlled with IV painmedication, sleeps if undistrubed,  Goal: Patient-Specific Goal (Individualized)  Outcome: Progressing  Goal: Absence of Hospital-Acquired Illness or Injury  Outcome: Progressing  Intervention: Prevent and Manage VTE (Venous Thromboembolism) Risk  Recent Flowsheet Documentation  Taken 2/18/2025 1100 by Dania Gaines RN  VTE Prevention/Management:   bilateral   SCDs (sequential compression devices) on  Goal: Optimal Comfort and Wellbeing  Outcome: Progressing  Intervention: Monitor Pain and Promote Comfort  Recent Flowsheet Documentation  Taken 2/18/2025 1100 by Dania Gaines RN  Pain Management Interventions:   relaxation techniques promoted   position adjusted  Intervention: Provide Person-Centered Care  Recent Flowsheet Documentation  Taken 2/18/2025 1100 by Dania Gaines RN  Trust Relationship/Rapport:   care explained   choices provided   emotional support provided   empathic listening provided   questions answered   questions encouraged   reassurance provided   thoughts/feelings acknowledged  Goal: Readiness for Transition of Care  Outcome: Progressing     Problem: Bariatric Surgery  Goal: Optimal Coping with Surgery  Outcome: Progressing  Goal: Absence of Bleeding  Outcome: Progressing  Goal: Fluid and Electrolyte Balance  Outcome: Progressing  Goal: Effective Gastrointestinal Motility and Elimination  Outcome: Progressing  Goal: Blood Glucose  Level Within Desired Range  Outcome: Progressing  Goal: Absence of Infection Signs and Symptoms  Outcome: Progressing  Goal: Anesthesia/Sedation Recovery  Outcome: Progressing  Goal: Optimal Pain Control and Function  Outcome: Progressing  Intervention: Prevent or Manage Pain  Recent Flowsheet Documentation  Taken 2/18/2025 1100 by Dania Gaines RN  Pain Management Interventions:   relaxation techniques promoted   position adjusted  Diversional Activities:   smartphone   television  Goal: Nausea and Vomiting Relief  Outcome: Progressing  Goal: Effective Urinary Elimination  Outcome: Progressing

## 2025-02-18 NOTE — OP NOTE
OPERATIVE REPORT    DATE: 02/18/25    PATIENT: Emmett Valladares    PREOPERATIVE DIAGNOSIS:    1. Morbid obesity with comorbidities    POSTOPERATIVE DIAGNOSIS:    1. Morbid obesity with multiple comorbidities.    PROCEDURES PERFORMED:  1. Robotic assisted laparoscopic sleeve gastrectomy (85% subtotal vertical gastrectomy)       SURGEON:  Tracee Michel M.D.    ASSISTANT: JUD Hairston was instrumental in the success of the case and provided retraction, suction, camera holding, and skin closure.    ANESTHESIA:  General endotracheal with TAP block    ESTIMATED BLOOD LOSS:   25 mL    FLUIDS:  Crystalloids.    SPECIMENS:  Subtotal gastrectomy.    DRAINS:  None.    COUNTS:  Correct.    COMPLICATIONS:  None.    FINDINGS: No hiatal hernia.    INDICATIONS:   Emmett Valladares is a 39 y.o. male with morbid obesity and associated comorbidities who presents for elective laparoscopic sleeve gastrectomy. The patient has undergone our extensive preoperative education, teaching, and consent process. Everything is in order and they wish to proceed.         DESCRIPTION OF PROCEDURE:   The patient was brought to the operating room and placed supine upon the operating room table. SCDs were placed. The patient underwent uneventful general endotracheal anesthesia and bilateral TAP blocks per the anesthesiology staff.  She received subcutaneous Lovenox and was given Ancef. The abdomen was prepped with ChloraPrep and draped in the usual sterile fashion. An Ioban was used as well.  Timeout was performed.     A small transverse incision was made a few centimeters above and to the right of the umbilicus and the peritoneal cavity entered under direct camera visualization using  a 10 mm 0° laparoscope and an OptiView trocar.  The abdomen was then insufflated to a pressure of 16 mmHg with CO2 gas. Exploratory laparoscopy revealed no evidence of injury from the entrance technique. The liver had a uniform capsule and was normal in size  without evidence of gross hepatomegaly or fibrosis. Three additional 8 mm ports were placed in a straight line across the abdomen. The robot was docked, all safety checks were performed, and I moved to the robot console.     A 2-0 Stratafix suture was placed to make a liver sling with bites of the peritoneum and the right gisele of the diaphragm, and the left lobe of the liver was elevated.  A Visigi bougie was inserted and used to decompress the stomach. The greater curvature vessels were taken down with the vessel sealer energy device beginning at the midpoint of the stomach and continued up to the angle of His, including the short gastrics. The left gisele was completely exposed and there was no sign of hiatal hernia here or anteriorly. This was photodocumented. The GE junction fat pad was elevated to make a clear landing zone for the stapler later. The greater curvature vessels were taken down with the energy device extending distally within 3 cm of the pylorus. Filmy adhesions to the stomach were taken down posteriorly.      The 36 [place] Visigi bougie was positioned along the lesser curvature of the stomach.  The stomach was marked at 1 cm from the angle of His, 3 cm from the incisura, and 5 cm from the pylorus using an ink saturated Kittner.  1 mg of IV glucagon was given to relax gastric smooth muscle.  Using the bougie as a template, a vertical sleeve gastrectomy was fashioned with successive staple loads: the first load was blue, and the following loads were white.   The staple line was examined and was hemostatic. The gastrectomy specimen was removed through the 12 mm port site. The specimen was later examined, and the staples were well-formed. Palpation of the specimen revealed no masses. The staple line of the sleeve gastrectomy revealed staples that were well-formed. The upper abdomen was flooded with saline, and a leak was performed by insufflating the stomach through the bougie. The leak test was normal.   The insufflated stomach was observed to be lying nicely without twist or stricture, and was appropriately sized.          I rescrubbed and suctioned the irrigation fluid from the upper abdomen, making sure it was dry. The staple line on the stomach was hemostatic.  The staple line was treated with 4 mL of aerosolized Tisseel fibrin glue. The liver stitch was removed easily. The 12 mm port was removed under direct visualization and there was no bleeding. This incision was closed with 0-Vicryl transfascial suture using an M Close device. All other ports were removed and then replaced under direct visualization and all of these were hemostatic. The instruments were removed and the abdomen was desufflated. The ports were removed.  3-0 Monocryl plus was used to close skin incisions with interrupted sutures. Skin glue was placed. 10 mg of IV Barhemsys was given at the end of the case.  The patient was awakened and taken to recovery in good condition, having tolerated the procedure well. All sponge, needle, and instrument counts were correct.

## 2025-02-18 NOTE — PAYOR COMM NOTE
"          Fay Hoffman RN  [facility]  Utilization Management  P:[phone]  F:[phone]    Ref # [id number]     Emmett Hudson (39 y.o. Male)       Date of Birth   1985    Social Security Number       Address   [address] [address]    Home Phone   [phone]    MRN   [id number]       Catholic   Syn[employer]    Marital Status                               Admission Date   2/18/25    Admission Type   Elective    Admitting Provider   Tracee Michel MD    Attending Provider   Tracee Michel MD    Department, Room/Bed   [facility] 2F, S207/1       Discharge Date       Discharge Disposition       Discharge Destination                                 Attending Provider: Tracee Michel MD    Allergies: No Known Allergies    Isolation: None   Infection: None   Code Status: CPR    Ht: 166.4 cm (65.5\")   Wt: 179 kg (394 lb)    Admission Cmt: None   Principal Problem: Morbid obesity with BMI of 60.0-69.9, adult [E66.01,Z68.44]                   Active Insurance as of 2/18/2025       Primary Coverage       Payor Plan Insurance Group Employer/Plan Group    [facility] [employer] [facility] [employer] EMPLOY[employer] [id number]       Payor Plan Address Payor Plan Phone Number Payor Plan Fax Number Effective Dates    [address] [phone]  1/1/2024 - None Entered    [place] [id number]         Subscriber Name Subscriber Birth Date Member ID       CLAUDIA HUDSON 7/6/1987 [id number]                     Emergency Contacts        (Rel.) Home Phone Work Phone Mobile Phone    claudia hudson (Spouse) [phone] -- [phone]                 History & Physical        Tracee Michel MD at 02/18/25 0611          H&P reviewed.  The patient was examined and there are no changes to the H&P  Electronically signed by Tracee Michel MD at 02/18/25 0611   Source Note: H&P (View-Only)          [facility] BAR[facility] " SURGERY  [address]  [address]  [facility] [address]  [phone]      Patient  Name:  Emmett Valladares  :  1985      Date of Visit: 2025      Chief Complaint:  weight gain; unable to maintain weight loss    History of Present Illness:  Emmett Valladares is a 39 y.o. male who presents today for evaluation, education and consultation regarding weight loss surgery.     Patient has been overweight for many years, with numerous failed dietary/weight loss attempts.  He now has obesity related comorbidities of HTN, OA, JASEN, prediabetes and as such has decided to pursue weight loss surgery.        2025 Update:    The patient lives in [place], [place], and is a .    No personal or family hx of VTE or clotting d/o.  No liver, lung, heart, or renal disease    He already started his preop diet.    Review of data:     LUH: nothing  HP neg    CBC          2024    13:44 2024    13:03 2025    08:25   CBC   WBC 10.88  11.1  9.26    RBC 4.85  4.99  4.70    Hemoglobin 14.3  14.2  13.7    Hematocrit 44.2  43.4  42.5    MCV 91.1  87  90.4    MCH 29.5  28.5  29.1    MCHC 32.4  32.7  32.2    RDW 13.7  13.5  14.6    Platelets 301  318  297      CMP          2024    13:44 2024    13:03 2025    08:25   CMP   Glucose 80  100  101    BUN 16  19  26    Creatinine 0.85  0.88  0.80    EGFR 113.4   115.5    Sodium 141  141  140    Potassium 4.3  4.5  4.5    Chloride 103  104  104    Calcium 9.4  9.6  8.7    Total Protein  6.7     Total Protein 7.0   7.3    Albumin 4.1  4.2  4.1    Globulin  2.5     Globulin 2.9   3.2    Total Bilirubin 0.3  0.4  0.5    Alkaline Phosphatase 90  96  72    AST (SGOT) 17  15  22    ALT (SGPT) 23  18  25    Albumin/Globulin Ratio 1.4   1.3    BUN/Creatinine Ratio 18.8  22  32.5    Anion Gap 11.6   11.0                 Cardiac clearance:low risk       PFTs:   Lung Volume  Lung volumes are consistent with air trapping.     Diffusion  The diffusing capacity for  carbon monoxide, a reflection of alveolar-capillary gas transport, is normal.           Last tobacco: 5 years ago  Last NSAIDs: ibuprofen last night  Last ASA: n/a  Last steroids: 2024 for URI           Past Medical History:   Diagnosis Date    Fatigue     Former smoker     quit 4 yrs ago    Hypertension     Insomnia     Joint pain     daily NSAIDs, prn steroid joint injections R knee ()    Prediabetes     Sleep apnea     CPAP compliant     Past Surgical History:   Procedure Laterality Date    TONSILLECTOMY         No Known Allergies    Current Outpatient Medications:     ibuprofen (ADVIL,MOTRIN) 800 MG tablet, Take 1 tablet by mouth Every 8 (Eight) Hours As Needed for Mild Pain., Disp: , Rfl:     losartan (COZAAR) 25 MG tablet, Take 1 tablet by mouth Daily., Disp: 90 tablet, Rfl: 1    traZODone (DESYREL) 50 MG tablet, Take 1 tablet by mouth every night at bedtime. (Patient taking differently: Take 1 tablet by mouth Every Night.), Disp: 30 tablet, Rfl: 0    apixaban (Eliquis) 2.5 MG tablet tablet, Take 1 tablet by mouth 2 (Two) Times a Day., Disp: 42 tablet, Rfl: 0    Social History     Socioeconomic History    Marital status:    Tobacco Use    Smoking status: Former     Current packs/day: 0.00     Types: Cigarettes     Quit date: 10/2020     Years since quittin.3     Passive exposure: Past    Smokeless tobacco: Never   Vaping Use    Vaping status: Never Used   Substance and Sexual Activity    Alcohol use: Not Currently     Comment: occasional    Drug use: Never    Sexual activity: Defer     Social History     Social History Narrative    Patient lives in [place], [place]. He is  with 4 children. He works full-time as a  at [employer] in [place].         Family History   Problem Relation Age of Onset    Gout Mother     Obesity Mother     Sleep apnea Mother     Obesity Maternal Grandmother     Hypertension Maternal Grandmother     Cancer Maternal Grandfather         bone and lung   at age 59       Review of Systems    Physical Exam:  Vital Signs:  Weight: (!) 179 kg (394 lb)   Body mass index is 64.57 kg/m².  Temp: 98 °F (36.7 °C)   Heart Rate: 98   BP: 130/82     Physical Exam  Vitals reviewed.   Constitutional:       Appearance: He is well-developed.   HENT:      Head: Normocephalic and atraumatic.      Nose: Nose normal.   Eyes:      Conjunctiva/sclera: Conjunctivae normal.      Pupils: Pupils are equal, round, and reactive to light.   Neck:      Thyroid: No thyromegaly.      Vascular: No carotid bruit.      Trachea: No tracheal deviation.   Cardiovascular:      Rate and Rhythm: Normal rate and regular rhythm.      Heart sounds: Normal heart sounds.   Pulmonary:      Effort: Pulmonary effort is normal. No respiratory distress.      Breath sounds: Normal breath sounds.   Abdominal:      General: There is no distension.      Palpations: Abdomen is soft.      Tenderness: There is no abdominal tenderness.      Comments: No surgical scars   Musculoskeletal:         General: No deformity. Normal range of motion.      Cervical back: Normal range of motion and neck supple.   Skin:     General: Skin is warm and dry.      Findings: No rash.   Neurological:      Mental Status: He is alert and oriented to person, place, and time.      Cranial Nerves: No cranial nerve deficit.      Coordination: Coordination normal.   Psychiatric:         Behavior: Behavior normal.         Thought Content: Thought content normal.         Judgment: Judgment normal.       Problem List Items Addressed This Visit       Prediabetes    JASEN on CPAP    Joint pain    Essential hypertension     Other Visit Diagnoses       Morbid obesity with BMI of 60.0-69.9, adult    -  Primary            Assessment:    Emmett Valladares is a 39 y.o. year old male with medically complicated obesity.    Weight loss surgery is deemed medically necessary given the following obesity related comorbidities including HTN, OA, JASEN, prediabetes with  "current Weight: (!) 179 kg (394 lb) and Body mass index is 64.57 kg/m²..    Patient is aware that surgery is a tool, and that weight loss is not guaranteed but only seen in the context of appropriate use, follow up and exercise.    The patient was present for an approximately a 2.5 hour discussion of the purpose of weight loss surgery, how WLS is a tool to assist in achieving weight loss goals, the most common complications and how best to avoid them, and the strategies for short and long term weight loss.  Ample opportunity to discuss questions was available both in group and during the time of individual examination.    I reviewed all available preop labs, Xrays, tests, clearances, etc and signed off on these in the record.  All of this in addition to the patient's unique history and exam has been taken into consideration in determining their appropriate candidacy for weight loss surgery.    Complications  of laparoscopic/possible robotic gastric sleeve were discussed. The patient is well aware of the potential complications of surgery that include but not limited to bleeding, infections, deep venous thrombosis, pulmonary embolism, pulmonary complications such as pneumonia, cardiac events, hernias, small bowel obstruction, damage to the spleen or other organs, bowel injury, disfiguring scars, failure to lose weight, need for additional surgery, conversion to an open procedure, and death. Patient is also aware of complications which apply in this particular procedure that can include but are not limited to a \"leak\" at the staple line which in some instances may require conversion to gastric bypass.    The patient is aware if a hiatal hernia is encountered, it likely will be repaired.  R/B/A Rx to hiatal hernia repair were discussed as outlined in our long consent form.  Briefly risks in addition to those for LSG include recurrent hernia, TAMARA, dysphagia, esophageal injury, pneumothorax, injury to the vagus nerves, " injury to the thoracic duct, aorta or vena cava.    Greater than 3 minutes was spent with the patient discussing avoiding all tobacco products and second hand smoke at least 2 weeks pre-operatively and 6 weeks post-operatively to minimize the risk of sleeve leak.  This included discussing the importance of avoiding even secondhand smoke as the risk of leak is increased.  Examples discussed:  I made it very clear that the patient understands they should avoid even riding in a car where someone has previously smoked in the last 2 weeks, living in a house where someone smokes (even if it's in a separate room/patio/attached garage, etc.) we discussed that they should not have a conversation with a group of people who are smoking even if it's outside.  They can be around wood burning fires and barbecue.  I told them I do not know if marijuana has a same effects but my overall recommendation is to avoid it for 2 weeks prior in 6 weeks after surgery.  They also are aware that nicotine may also increase the risk of leak and I strongly encouraged him to avoid that as well for 2 weeks prior in 6 weeks after surgery.    Discussed the risks, benefits and alternative therapies at great length as outlined in our extensive consent forms, consent videos, and educational teaching process under the direction of the center's .    A copy of the patient's signed informed consent is on file.    Plan:  Laparoscopic robotic assisted sleeve gastrectomy at [facility]      R/B/A Rx discussed to postop anticoagulation including but not limited to bleeding, drug reaction, venothromboembolic events, etc. and he is agreeable to taking 3 weeks of Eliquis.    MDM high:  Elective procedure with the following risk factors: morbid obesity, JASEN, HTN  4+ chronic medical problems reviewed.    I spent 45 minutes caring for Emmett on this date of service. This time includes time spent by me in the following activities: preparing for the visit,  reviewing tests, performing a medically appropriate examination and/or evaluation, counseling and educating the patient/family/caregiver, referring and communicating with other health care professionals, documenting information in the medical record, independently interpreting results and communicating that information with the patient/family/caregiver, care coordination, ordering medications, ordering test(s), ordering procedure(s), obtaining a separately obtained history, and reviewing a separately obtained history.       Thank you Audrey Arcos for allowing me to share in the care of our mutual patient.             Tracee Michel MD                                             Electronically signed by Tracee Michel MD at 25 [phone]                 Tracee Michel MD at 25 [phone]          [facility] BARIATRIC SURGERY  [address] [address] [phone]      Patient  Name:  Emmett Valladares  :  1985      Date of Visit: 2025      Chief Complaint:  weight gain; unable to maintain weight loss    History of Present Illness:  Emmett Valladares is a 39 y.o. male who presents today for evaluation, education and consultation regarding weight loss surgery.     Patient has been overweight for many years, with numerous failed dietary/weight loss attempts.  He now has obesity related comorbidities of HTN, OA, JASEN, prediabetes and as such has decided to pursue weight loss surgery.        2025 Update:    The patient lives in [place], [place], and is a .    No personal or family hx of VTE or clotting d/o.  No liver, lung, heart, or renal disease    He already started his preop diet.    Review of data:     LUH: nothing  HP neg    CBC          2024    13:44 2024    13:03 2025    08:25   CBC   WBC 10.88  11.1  9.26    RBC 4.85  4.99  4.70    Hemoglobin 14.3  14.2  13.7    Hematocrit 44.2  43.4  42.5    MCV 91.1  87  90.4     MCH 29.5  28.5  29.1    MCHC 32.4  32.7  32.2    RDW 13.7  13.5  14.6    Platelets 301  318  297      CMP          5/1/2024    13:44 11/7/2024    13:03 1/23/2025    08:25   CMP   Glucose 80  100  101    BUN 16  19  26    Creatinine 0.85  0.88  0.80    EGFR 113.4   115.5    Sodium 141  141  140    Potassium 4.3  4.5  4.5    Chloride 103  104  104    Calcium 9.4  9.6  8.7    Total Protein  6.7     Total Protein 7.0   7.3    Albumin 4.1  4.2  4.1    Globulin  2.5     Globulin 2.9   3.2    Total Bilirubin 0.3  0.4  0.5    Alkaline Phosphatase 90  96  72    AST (SGOT) 17  15  22    ALT (SGPT) 23  18  25    Albumin/Globulin Ratio 1.4   1.3    BUN/Creatinine Ratio 18.8  22  32.5    Anion Gap 11.6   11.0                 Cardiac clearance:low risk       PFTs:   Lung Volume  Lung volumes are consistent with air trapping.     Diffusion  The diffusing capacity for carbon monoxide, a reflection of alveolar-capillary gas transport, is normal.           Last tobacco: 5 years ago  Last NSAIDs: ibuprofen last night  Last ASA: n/a  Last steroids: 12/2024 for URI           Past Medical History:   Diagnosis Date    Fatigue     Former smoker     quit 4 yrs ago    Hypertension     Insomnia     Joint pain     daily NSAIDs, prn steroid joint injections R knee (2024)    Prediabetes     Sleep apnea     CPAP compliant     Past Surgical History:   Procedure Laterality Date    TONSILLECTOMY  1990       No Known Allergies    Current Outpatient Medications:     ibuprofen (ADVIL,MOTRIN) 800 MG tablet, Take 1 tablet by mouth Every 8 (Eight) Hours As Needed for Mild Pain., Disp: , Rfl:     losartan (COZAAR) 25 MG tablet, Take 1 tablet by mouth Daily., Disp: 90 tablet, Rfl: 1    traZODone (DESYREL) 50 MG tablet, Take 1 tablet by mouth every night at bedtime. (Patient taking differently: Take 1 tablet by mouth Every Night.), Disp: 30 tablet, Rfl: 0    apixaban (Eliquis) 2.5 MG tablet tablet, Take 1 tablet by mouth 2 (Two) Times a Day., Disp: 42  tablet, Rfl: 0    Social History     Socioeconomic History    Marital status:    Tobacco Use    Smoking status: Former     Current packs/day: 0.00     Types: Cigarettes     Quit date: 10/2020     Years since quittin.3     Passive exposure: Past    Smokeless tobacco: Never   Vaping Use    Vaping status: Never Used   Substance and Sexual Activity    Alcohol use: Not Currently     Comment: occasional    Drug use: Never    Sexual activity: Defer     Social History     Social History Narrative    Patient lives in [place], [place]. He is  with 4 children. He works full-time as a  at [employer] in [place].         Family History   Problem Relation Age of Onset    Gout Mother     Obesity Mother     Sleep apnea Mother     Obesity Maternal Grandmother     Hypertension Maternal Grandmother     Cancer Maternal Grandfather         bone and lung  at age 59       Review of Systems    Physical Exam:  Vital Signs:  Weight: (!) 179 kg (394 lb)   Body mass index is 64.57 kg/m².  Temp: 98 °F (36.7 °C)   Heart Rate: 98   BP: 130/82     Physical Exam  Vitals reviewed.   Constitutional:       Appearance: He is well-developed.   HENT:      Head: Normocephalic and atraumatic.      Nose: Nose normal.   Eyes:      Conjunctiva/sclera: Conjunctivae normal.      Pupils: Pupils are equal, round, and reactive to light.   Neck:      Thyroid: No thyromegaly.      Vascular: No carotid bruit.      Trachea: No tracheal deviation.   Cardiovascular:      Rate and Rhythm: Normal rate and regular rhythm.      Heart sounds: Normal heart sounds.   Pulmonary:      Effort: Pulmonary effort is normal. No respiratory distress.      Breath sounds: Normal breath sounds.   Abdominal:      General: There is no distension.      Palpations: Abdomen is soft.      Tenderness: There is no abdominal tenderness.      Comments: No surgical scars   Musculoskeletal:         General: No deformity. Normal range of motion.      Cervical back:  Normal range of motion and neck supple.   Skin:     General: Skin is warm and dry.      Findings: No rash.   Neurological:      Mental Status: He is alert and oriented to person, place, and time.      Cranial Nerves: No cranial nerve deficit.      Coordination: Coordination normal.   Psychiatric:         Behavior: Behavior normal.         Thought Content: Thought content normal.         Judgment: Judgment normal.       Problem List Items Addressed This Visit       Prediabetes    JASEN on CPAP    Joint pain    Essential hypertension     Other Visit Diagnoses       Morbid obesity with BMI of 60.0-69.9, adult    -  Primary            Assessment:    Emmett Valladares is a 39 y.o. year old male with medically complicated obesity.    Weight loss surgery is deemed medically necessary given the following obesity related comorbidities including HTN, OA, JASEN, prediabetes with current Weight: (!) 179 kg (394 lb) and Body mass index is 64.57 kg/m²..    Patient is aware that surgery is a tool, and that weight loss is not guaranteed but only seen in the context of appropriate use, follow up and exercise.    The patient was present for an approximately a 2.5 hour discussion of the purpose of weight loss surgery, how WLS is a tool to assist in achieving weight loss goals, the most common complications and how best to avoid them, and the strategies for short and long term weight loss.  Ample opportunity to discuss questions was available both in group and during the time of individual examination.    I reviewed all available preop labs, Xrays, tests, clearances, etc and signed off on these in the record.  All of this in addition to the patient's unique history and exam has been taken into consideration in determining their appropriate candidacy for weight loss surgery.    Complications  of laparoscopic/possible robotic gastric sleeve were discussed. The patient is well aware of the potential complications of surgery that include but not  "limited to bleeding, infections, deep venous thrombosis, pulmonary embolism, pulmonary complications such as pneumonia, cardiac events, hernias, small bowel obstruction, damage to the spleen or other organs, bowel injury, disfiguring scars, failure to lose weight, need for additional surgery, conversion to an open procedure, and death. Patient is also aware of complications which apply in this particular procedure that can include but are not limited to a \"leak\" at the staple line which in some instances may require conversion to gastric bypass.    The patient is aware if a hiatal hernia is encountered, it likely will be repaired.  R/B/A Rx to hiatal hernia repair were discussed as outlined in our long consent form.  Briefly risks in addition to those for LSG include recurrent hernia, TAMARA, dysphagia, esophageal injury, pneumothorax, injury to the vagus nerves, injury to the thoracic duct, aorta or vena cava.    Greater than 3 minutes was spent with the patient discussing avoiding all tobacco products and second hand smoke at least 2 weeks pre-operatively and 6 weeks post-operatively to minimize the risk of sleeve leak.  This included discussing the importance of avoiding even secondhand smoke as the risk of leak is increased.  Examples discussed:  I made it very clear that the patient understands they should avoid even riding in a car where someone has previously smoked in the last 2 weeks, living in a house where someone smokes (even if it's in a separate room/patio/attached garage, etc.) we discussed that they should not have a conversation with a group of people who are smoking even if it's outside.  They can be around wood burning fires and barbecue.  I told them I do not know if marijuana has a same effects but my overall recommendation is to avoid it for 2 weeks prior in 6 weeks after surgery.  They also are aware that nicotine may also increase the risk of leak and I strongly encouraged him to avoid that as " well for 2 weeks prior in 6 weeks after surgery.    Discussed the risks, benefits and alternative therapies at great length as outlined in our extensive consent forms, consent videos, and educational teaching process under the direction of the center's .    A copy of the patient's signed informed consent is on file.    Plan:  Laparoscopic robotic assisted sleeve gastrectomy at [facility]      R/B/A Rx discussed to postop anticoagulation including but not limited to bleeding, drug reaction, venothromboembolic events, etc. and he is agreeable to taking 3 weeks of Eliquis.    MDM high:  Elective procedure with the following risk factors: morbid obesity, JASEN, HTN  4+ chronic medical problems reviewed.    I spent 45 minutes caring for Emmett on this date of service. This time includes time spent by me in the following activities: preparing for the visit, reviewing tests, performing a medically appropriate examination and/or evaluation, counseling and educating the patient/family/caregiver, referring and communicating with other health care professionals, documenting information in the medical record, independently interpreting results and communicating that information with the patient/family/caregiver, care coordination, ordering medications, ordering test(s), ordering procedure(s), obtaining a separately obtained history, and reviewing a separately obtained history.       Thank you Audrey Arcos for allowing me to share in the care of our mutual patient.             Tracee Michel MD                                             Electronically signed by Tracee Michel MD at 01/27/25 1557       Current Facility-Administered Medications   Medication Dose Route Frequency Provider Last Rate Last Admin    acetaminophen (TYLENOL) tablet 1,000 mg  1,000 mg Oral Q8H Tracee Michel MD   1,000 mg at 02/18/25 1435    Or    acetaminophen (TYLENOL) 160 MG/5ML oral solution 1,000 mg  1,000 mg Oral  Q8H Tracee Michel MD        ALPRAZolam (XANAX) tablet 0.25 mg  0.25 mg Oral BID PRN Tracee Michel MD        ceFAZolin 3000 mg IVPB in 100 mL NS (MBP)  3,000 mg Intravenous Q8H Tracee Michel MD   3,000 mg at 02/18/25 1601    [START ON 2/19/2025] cyanocobalamin injection 1,000 mcg  1,000 mcg Intramuscular Once Tracee Michel MD        diphenhydrAMINE (BENADRYL) injection 25 mg  25 mg Intravenous Q4H PRN Tracee Michel MD        Enoxaparin Sodium (LOVENOX) syringe 60 mg  60 mg Subcutaneous Once Tracee Michel MD        [START ON 2/19/2025] ferric gluconate (FERRLECIT)125 MG in sodium chloride 0.9 % 100 mL IVPB  125 mg Intravenous Once PRN Tracee Michel MD        gabapentin (NEURONTIN) capsule 100 mg  100 mg Oral TID Tracee Michel MD   100 mg at 02/18/25 1435    Or    gabapentin (NEURONTIN) 50 mg/mL solution 100 mg  100 mg Oral TID Tracee Michel MD        hydrALAZINE (APRESOLINE) injection 10 mg  10 mg Intravenous Q30 Min PRN Tracee Michel MD   10 mg at 02/18/25 1601    HYDROmorphone (DILAUDID) injection 0.5 mg  0.5 mg Intravenous Q2H PRN Tracee Michel MD   0.5 mg at 02/18/25 1435    And    naloxone (NARCAN) injection 0.1 mg  0.1 mg Intravenous Q5 Min PRN Tracee Michel MD        HYDROmorphone (DILAUDID) tablet 2 mg  2 mg Oral Q4H PRN Tracee Michel MD        labetalol (NORMODYNE,TRANDATE) injection 10 mg  10 mg Intravenous Q30 Min PRN Tracee Michel MD        [START ON 2/19/2025] lactated ringers infusion  9 mL/hr Intravenous Continuous Elia De Leon MD   New Bag at 02/18/25 0743    lactated ringers infusion  9 mL/hr Intravenous Continuous Elia Asencio CRNA        lactated ringers infusion  150 mL/hr Intravenous Continuous Tracee Michel MD        losartan (COZAAR) tablet 25 mg  25 mg Oral Daily Tracee Michel MD   25 mg at 02/18/25 1435    metoclopramide (REGLAN) tablet 10 mg   10 mg Oral Q6H PRN Tracee Michel MD        Or    metoclopramide (REGLAN) injection 10 mg  10 mg Intravenous Q6H PRN Tracee Michel MD        niCARdipine (CARDENE) 25mg in 250mL NS infusion  5-15 mg/hr Intravenous Titrated Tracee Michel MD        ondansetron ODT (ZOFRAN-ODT) disintegrating tablet 4 mg  4 mg Oral Q6H PRN Tracee Michel MD        Or    ondansetron (ZOFRAN) injection 4 mg  4 mg Intravenous Q6H PRN Tracee Michel MD   4 mg at 02/18/25 1057    oxyCODONE (ROXICODONE) immediate release tablet 5 mg  5 mg Oral Q6H PRN Tracee Michel MD        pantoprazole (PROTONIX) injection 40 mg  40 mg Intravenous Once Tracee Michel MD        Followed by    [START ON 2/19/2025] pantoprazole (PROTONIX) EC tablet 40 mg  40 mg Oral QAM Tracee Michel MD        Pharmacy to Dose enoxaparin (LOVENOX)   Not Applicable Continuous PRN Tracee Michel MD        phenol (CHLORASEPTIC) 1.4 % liquid 1 spray  1 spray Mouth/Throat Q2H PRN Tracee Michel MD        prochlorperazine (COMPAZINE) injection 10 mg  10 mg Intravenous Q6H PRN Tracee Michel MD        Or    prochlorperazine (COMPAZINE) tablet 10 mg  10 mg Oral Q6H PRN Tracee Michel MD        Or    prochlorperazine (COMPAZINE) suppository 25 mg  25 mg Rectal Q12H PRN Tracee Michel MD        promethazine (PHENERGAN) tablet 12.5 mg  12.5 mg Oral Q6H PRN Tracee Michel MD        Or    promethazine (PHENERGAN) suppository 12.5 mg  12.5 mg Rectal Q6H PRN Tracee Michel MD        scopolamine patch 1 mg/72 hr  1 patch Transdermal Continuous Tracee Michel MD   1 patch at 02/18/25 0612    scopolamine patch 1 mg/72 hr  1 patch Transdermal Once Tracee Michel MD        simethicone (MYLICON) chewable tablet 80 mg  80 mg Oral 4x Daily PRN Tracee Michel MD        [START ON 2/19/2025] sodium chloride 0.45 % with KCl 20 mEq/L infusion  100 mL/hr  Intravenous Continuous Tracee Michel MD        sodium chloride 0.9 % flush 1-10 mL  1-10 mL Intravenous PRN Tracee Michel MD        sodium chloride 0.9 % flush 10 mL  10 mL Intravenous Q12H Tracee Michel MD        sodium chloride 0.9 % infusion 40 mL  40 mL Intravenous PRN Tracee Michel MD        sodium chloride 0.9 % infusion  150 mL/hr Intravenous Continuous Tracee Michel  mL/hr at 02/18/25 1440 150 mL/hr at 02/18/25 1440    [START ON 2/19/2025] thiamine (B-1) 200 mg, folic acid 1 mg in sodium chloride 0.9 % 1,000 mL infusion  200 mL/hr Intravenous Once Tracee Michel MD        traZODone (DESYREL) tablet 50 mg  50 mg Oral Nightly Tracee Michel MD         Lab Results (all)       Procedure Component Value Units Date/Time    Tissue Pathology Exam [[id number]] Collected: 02/18/25 0803    Specimen: Tissue from Stomach Updated: 02/18/25 0935               Operative/Procedure Notes (all)        Tracee Michel MD at 02/18/25 0752  Version 1 of 1         OPERATIVE REPORT    DATE: 02/18/25    PATIENT: Emmett Valladares    PREOPERATIVE DIAGNOSIS:    1. Morbid obesity with comorbidities    POSTOPERATIVE DIAGNOSIS:    1. Morbid obesity with multiple comorbidities.    PROCEDURES PERFORMED:  1. Robotic assisted laparoscopic sleeve gastrectomy (85% subtotal vertical gastrectomy)       SURGEON:  Tracee Michel M.D.    ASSISTANT: JUD Hairston was instrumental in the success of the case and provided retraction, suction, camera holding, and skin closure.    ANESTHESIA:  General endotracheal with TAP block    ESTIMATED BLOOD LOSS:   25 mL    FLUIDS:  Crystalloids.    SPECIMENS:  Subtotal gastrectomy.    DRAINS:  None.    COUNTS:  Correct.    COMPLICATIONS:  None.    FINDINGS: No hiatal hernia.    INDICATIONS:   Emmett Valladares is a 39 y.o. male with morbid obesity and associated comorbidities who presents for elective laparoscopic sleeve  gastrectomy. The patient has undergone our extensive preoperative education, teaching, and consent process. Everything is in order and they wish to proceed.         DESCRIPTION OF PROCEDURE:   The patient was brought to the operating room and placed supine upon the operating room table. SCDs were placed. The patient underwent uneventful general endotracheal anesthesia and bilateral TAP blocks per the anesthesiology staff.  She received subcutaneous Lovenox and was given Ancef. The abdomen was prepped with ChloraPrep and draped in the usual sterile fashion. An Ioban was used as well.  Timeout was performed.     A small transverse incision was made a few centimeters above and to the right of the umbilicus and the peritoneal cavity entered under direct camera visualization using  a 10 mm 0° laparoscope and an OptiView trocar.  The abdomen was then insufflated to a pressure of 16 mmHg with CO2 gas. Exploratory laparoscopy revealed no evidence of injury from the entrance technique. The liver had a uniform capsule and was normal in size without evidence of gross hepatomegaly or fibrosis. Three additional 8 mm ports were placed in a straight line across the abdomen. The robot was docked, all safety checks were performed, and I moved to the robot console.     A 2-0 Stratafix suture was placed to make a liver sling with bites of the peritoneum and the right gisele of the diaphragm, and the left lobe of the liver was elevated.  A Visigi bougie was inserted and used to decompress the stomach. The greater curvature vessels were taken down with the vessel sealer energy device beginning at the midpoint of the stomach and continued up to the angle of His, including the short gastrics. The left gisele was completely exposed and there was no sign of hiatal hernia here or anteriorly. This was photodocumented. The GE junction fat pad was elevated to make a clear landing zone for the stapler later. The greater curvature vessels were taken  down with the energy device extending distally within 3 cm of the pylorus. Filmy adhesions to the stomach were taken down posteriorly.      The 36 [place] Visigi bougie was positioned along the lesser curvature of the stomach.  The stomach was marked at 1 cm from the angle of His, 3 cm from the incisura, and 5 cm from the pylorus using an ink saturated Kittner.  1 mg of IV glucagon was given to relax gastric smooth muscle.  Using the bougie as a template, a vertical sleeve gastrectomy was fashioned with successive staple loads: the first load was blue, and the following loads were white.   The staple line was examined and was hemostatic. The gastrectomy specimen was removed through the 12 mm port site. The specimen was later examined, and the staples were well-formed. Palpation of the specimen revealed no masses. The staple line of the sleeve gastrectomy revealed staples that were well-formed. The upper abdomen was flooded with saline, and a leak was performed by insufflating the stomach through the bougie. The leak test was normal.  The insufflated stomach was observed to be lying nicely without twist or stricture, and was appropriately sized.          I rescrubbed and suctioned the irrigation fluid from the upper abdomen, making sure it was dry. The staple line on the stomach was hemostatic.  The staple line was treated with 4 mL of aerosolized Tisseel fibrin glue. The liver stitch was removed easily. The 12 mm port was removed under direct visualization and there was no bleeding. This incision was closed with 0-Vicryl transfascial suture using an M Close device. All other ports were removed and then replaced under direct visualization and all of these were hemostatic. The instruments were removed and the abdomen was desufflated. The ports were removed.  3-0 Monocryl plus was used to close skin incisions with interrupted sutures. Skin glue was placed. 10 mg of IV Barhemsys was given at the end of the case.  The  patient was awakened and taken to recovery in good condition, having tolerated the procedure well. All sponge, needle, and instrument counts were correct.           Electronically signed by Tracee Michel MD at 02/18/25 0962

## 2025-02-18 NOTE — ANESTHESIA POSTPROCEDURE EVALUATION
Patient: Emmett Valladares    Procedure Summary       Date: 02/18/25 Room / Location:  HOSSEIN OR  /  HOSSEIN OR    Anesthesia Start: 0726 Anesthesia Stop: 0901    Procedure: GASTRIC SLEEVE LAPAROSCOPIC WITH DAVINCI ROBOT (Abdomen) Diagnosis:       Morbid obesity with BMI of 60.0-69.9, adult      (Morbid obesity with BMI of 60.0-69.9, adult [E66.01, Z68.44])    Surgeons: Tracee Michel MD Provider: Elia De Leon MD    Anesthesia Type: general ASA Status: 3            Anesthesia Type: general    Vitals  Vitals Value Taken Time   BP     Temp     Pulse 74 02/18/25 0900   Resp     SpO2 94 % 02/18/25 0900   Vitals shown include unfiled device data.        Post Anesthesia Care and Evaluation    Patient location during evaluation: PACU  Patient participation: complete - patient participated  Level of consciousness: awake and alert and sleepy but conscious  Pain management: adequate    Airway patency: patent  Anesthetic complications: No anesthetic complications  PONV Status: none  Cardiovascular status: hemodynamically stable and acceptable  Respiratory status: nonlabored ventilation, acceptable and nasal cannula  Hydration status: acceptable

## 2025-02-19 VITALS
SYSTOLIC BLOOD PRESSURE: 152 MMHG | WEIGHT: 315 LBS | TEMPERATURE: 97.8 F | RESPIRATION RATE: 16 BRPM | OXYGEN SATURATION: 97 % | DIASTOLIC BLOOD PRESSURE: 94 MMHG | HEART RATE: 77 BPM | BODY MASS INDEX: 50.62 KG/M2 | HEIGHT: 66 IN

## 2025-02-19 LAB
ALBUMIN SERPL-MCNC: 3.7 G/DL (ref 3.5–5.2)
ALBUMIN/GLOB SERPL: 1.4 G/DL
ALP SERPL-CCNC: 48 U/L (ref 39–117)
ALT SERPL W P-5'-P-CCNC: 24 U/L (ref 1–41)
ANION GAP SERPL CALCULATED.3IONS-SCNC: 8 MMOL/L (ref 5–15)
AST SERPL-CCNC: 18 U/L (ref 1–40)
BASOPHILS # BLD AUTO: 0.03 10*3/MM3 (ref 0–0.2)
BASOPHILS NFR BLD AUTO: 0.3 % (ref 0–1.5)
BILIRUB SERPL-MCNC: 0.4 MG/DL (ref 0–1.2)
BUN SERPL-MCNC: 16 MG/DL (ref 6–20)
BUN/CREAT SERPL: 18.6 (ref 7–25)
CALCIUM SPEC-SCNC: 8.4 MG/DL (ref 8.6–10.5)
CHLORIDE SERPL-SCNC: 104 MMOL/L (ref 98–107)
CO2 SERPL-SCNC: 26 MMOL/L (ref 22–29)
CREAT SERPL-MCNC: 0.86 MG/DL (ref 0.76–1.27)
CYTO UR: NORMAL
DEPRECATED RDW RBC AUTO: 46.5 FL (ref 37–54)
EGFRCR SERPLBLD CKD-EPI 2021: 113 ML/MIN/1.73
EOSINOPHIL # BLD AUTO: 0.01 10*3/MM3 (ref 0–0.4)
EOSINOPHIL NFR BLD AUTO: 0.1 % (ref 0.3–6.2)
ERYTHROCYTE [DISTWIDTH] IN BLOOD BY AUTOMATED COUNT: 13.7 % (ref 12.3–15.4)
GLOBULIN UR ELPH-MCNC: 2.6 GM/DL
GLUCOSE SERPL-MCNC: 86 MG/DL (ref 65–99)
HCT VFR BLD AUTO: 40.2 % (ref 37.5–51)
HGB BLD-MCNC: 12.6 G/DL (ref 13–17.7)
IMM GRANULOCYTES # BLD AUTO: 0.04 10*3/MM3 (ref 0–0.05)
IMM GRANULOCYTES NFR BLD AUTO: 0.3 % (ref 0–0.5)
IRON 24H UR-MRATE: 55 MCG/DL (ref 59–158)
LAB AP CASE REPORT: NORMAL
LAB AP CLINICAL INFORMATION: NORMAL
LYMPHOCYTES # BLD AUTO: 2.46 10*3/MM3 (ref 0.7–3.1)
LYMPHOCYTES NFR BLD AUTO: 20.6 % (ref 19.6–45.3)
MCH RBC QN AUTO: 28.6 PG (ref 26.6–33)
MCHC RBC AUTO-ENTMCNC: 31.3 G/DL (ref 31.5–35.7)
MCV RBC AUTO: 91.2 FL (ref 79–97)
MONOCYTES # BLD AUTO: 1.11 10*3/MM3 (ref 0.1–0.9)
MONOCYTES NFR BLD AUTO: 9.3 % (ref 5–12)
NEUTROPHILS NFR BLD AUTO: 69.4 % (ref 42.7–76)
NEUTROPHILS NFR BLD AUTO: 8.29 10*3/MM3 (ref 1.7–7)
NRBC BLD AUTO-RTO: 0 /100 WBC (ref 0–0.2)
PATH REPORT.FINAL DX SPEC: NORMAL
PATH REPORT.GROSS SPEC: NORMAL
PLATELET # BLD AUTO: 269 10*3/MM3 (ref 140–450)
PMV BLD AUTO: 9.7 FL (ref 6–12)
POTASSIUM SERPL-SCNC: 4 MMOL/L (ref 3.5–5.2)
PROT SERPL-MCNC: 6.3 G/DL (ref 6–8.5)
RBC # BLD AUTO: 4.41 10*6/MM3 (ref 4.14–5.8)
SODIUM SERPL-SCNC: 138 MMOL/L (ref 136–145)
WBC NRBC COR # BLD AUTO: 11.94 10*3/MM3 (ref 3.4–10.8)

## 2025-02-19 PROCEDURE — 25010000002 CYANOCOBALAMIN PER 1000 MCG: Performed by: SURGERY

## 2025-02-19 PROCEDURE — 80053 COMPREHEN METABOLIC PANEL: CPT | Performed by: SURGERY

## 2025-02-19 PROCEDURE — 25010000002 ENOXAPARIN PER 10 MG: Performed by: SURGERY

## 2025-02-19 PROCEDURE — 25810000003 SODIUM CHLORIDE 0.9 % SOLUTION 1,000 ML FLEX CONT: Performed by: SURGERY

## 2025-02-19 PROCEDURE — 99024 POSTOP FOLLOW-UP VISIT: CPT | Performed by: SURGERY

## 2025-02-19 PROCEDURE — 83540 ASSAY OF IRON: CPT | Performed by: SURGERY

## 2025-02-19 PROCEDURE — 85025 COMPLETE CBC W/AUTO DIFF WBC: CPT | Performed by: SURGERY

## 2025-02-19 PROCEDURE — 25010000002 CEFAZOLIN 3 G RECONSTITUTED SOLUTION 1 EACH VIAL: Performed by: SURGERY

## 2025-02-19 PROCEDURE — 25010000002 POTASSIUM CHLORIDE PER 2 MEQ: Performed by: SURGERY

## 2025-02-19 PROCEDURE — 25010000002 THIAMINE PER 100 MG: Performed by: SURGERY

## 2025-02-19 RX ORDER — PANTOPRAZOLE SODIUM 40 MG/1
40 TABLET, DELAYED RELEASE ORAL DAILY
Status: DISCONTINUED | OUTPATIENT
Start: 2025-02-19 | End: 2025-02-19 | Stop reason: HOSPADM

## 2025-02-19 RX ORDER — OXYCODONE HYDROCHLORIDE 5 MG/1
5 TABLET ORAL EVERY 4 HOURS PRN
Qty: 10 TABLET | Refills: 0 | Status: SHIPPED | OUTPATIENT
Start: 2025-02-19

## 2025-02-19 RX ORDER — ENOXAPARIN SODIUM 100 MG/ML
60 INJECTION SUBCUTANEOUS 2 TIMES DAILY
Status: DISCONTINUED | OUTPATIENT
Start: 2025-02-19 | End: 2025-02-19 | Stop reason: HOSPADM

## 2025-02-19 RX ORDER — OMEPRAZOLE 40 MG/1
40 CAPSULE, DELAYED RELEASE ORAL DAILY
Qty: 60 CAPSULE | Refills: 0 | Status: SHIPPED | OUTPATIENT
Start: 2025-02-19 | End: 2025-04-20

## 2025-02-19 RX ORDER — ONDANSETRON 4 MG/1
4 TABLET, ORALLY DISINTEGRATING ORAL EVERY 4 HOURS PRN
Qty: 10 TABLET | Refills: 0 | Status: SHIPPED | OUTPATIENT
Start: 2025-02-19

## 2025-02-19 RX ADMIN — SODIUM CHLORIDE AND POTASSIUM CHLORIDE 100 ML/HR: 150; 450 INJECTION, SOLUTION INTRAVENOUS at 09:24

## 2025-02-19 RX ADMIN — GABAPENTIN 100 MG: 100 CAPSULE ORAL at 09:10

## 2025-02-19 RX ADMIN — THIAMINE HYDROCHLORIDE 200 ML/HR: 100 INJECTION, SOLUTION INTRAMUSCULAR; INTRAVENOUS at 03:17

## 2025-02-19 RX ADMIN — ENOXAPARIN SODIUM 60 MG: 60 INJECTION SUBCUTANEOUS at 13:23

## 2025-02-19 RX ADMIN — PANTOPRAZOLE SODIUM 40 MG: 40 TABLET, DELAYED RELEASE ORAL at 09:29

## 2025-02-19 RX ADMIN — CYANOCOBALAMIN 1000 MCG: 1000 INJECTION, SOLUTION INTRAMUSCULAR at 09:11

## 2025-02-19 RX ADMIN — ACETAMINOPHEN 1000 MG: 500 TABLET ORAL at 09:17

## 2025-02-19 RX ADMIN — ACETAMINOPHEN 1000 MG: 500 TABLET ORAL at 13:23

## 2025-02-19 RX ADMIN — LOSARTAN POTASSIUM 25 MG: 25 TABLET, FILM COATED ORAL at 09:11

## 2025-02-19 RX ADMIN — SODIUM CHLORIDE 3000 MG: 900 INJECTION INTRAVENOUS at 01:18

## 2025-02-19 NOTE — PLAN OF CARE
Goal Outcome Evaluation:  Plan of Care Reviewed With: patient, spouse           Outcome Evaluation: VSS on RA, Ax0x4. Ambulating frequently around the unit. passing flatus. c/o soreness to abdomen that was controlled with po tylenol. Pt tolerating protein shake (240ml so far). and stage 1 Bariatric diet. Incision sites CDI. Pt watched bariatric discharge video prior to discharge. Pt states that he has no further questions.

## 2025-02-19 NOTE — CASE MANAGEMENT/SOCIAL WORK
Discharge Planning Assessment  [facility]     Patient Name: Emmett Valladares  MRN: [id number]  Today's Date: 2/19/2025    Admit Date: 2/18/2025    Plan: Home with wife   Discharge Needs Assessment    No documentation.                  Discharge Plan       Row Name 02/19/25 0919       Plan    Plan Home with wife    Patient/Family in Agreement with Plan yes    Plan Comments I met with the patient at the bedside. He lives in [facility] in a trailer home with his wife. He is independent, works full time at [employer], and drives. No home DME use or needs. Insurance confirmed to be [employer] and his PCP is Audrey Arcos. He plans to discharge home today with his wife transporting. He denied any needs from CM today. CM will follow.    Final Discharge Disposition Code 01 - home or self-care                  Continued Care and Services - Admitted Since 2/18/2025    No active coordination exists for this encounter.          Demographic Summary    No documentation.                  Functional Status    No documentation.                  Psychosocial    No documentation.                  Abuse/Neglect    No documentation.                  Legal    No documentation.                  Substance Abuse    No documentation.                  Patient Forms    No documentation.                     Lesa Bermeo RN

## 2025-02-19 NOTE — PROGRESS NOTES
"Bariatric Surgery Progress Note:      Chief Complaint:  POD #1  \"feel ok\"    Subjective   He is sitting up at the side of the bed.  His wife is in the room.  He looks and feels well and wants to go home.  Ambulating the halls and voiding well.  Tolerating his diet without nausea or vomiting.  Passing flatus, no bowel movement, no pulmonary complaints, spirometer over 2000 observed.    Objective     Vital Signs  Blood pressure 152/94, pulse 109, temperature 97.8 °F (36.6 °C), temperature source Oral, resp. rate 16, height 166.4 cm (65.5\"), weight (!) 179 kg (394 lb), SpO2 93%.  Pulse 109 blood pressure 152/94    Intake/Output Summary (Last 24 hours) at 2/19/2025 1646  Last data filed at 2/19/2025 0924  Gross per 24 hour   Intake 2085 ml   Output --   Net 2085 ml     - NR    Physical Exam:  He is in no apparent distress.  Abdomen soft, appropriately tender, nondistended, bowel sounds active.  Wounds look okay.       Labs:  Lab Results (last 24 hours)       Procedure Component Value Units Date/Time    Comprehensive Metabolic Panel [[id number]]  (Abnormal) Collected: 02/19/25 1338    Specimen: Blood Updated: 02/19/25 1427     Glucose 86 mg/dL      BUN 16 mg/dL      Creatinine 0.86 mg/dL      Sodium 138 mmol/L      Potassium 4.0 mmol/L      Chloride 104 mmol/L      CO2 26.0 mmol/L      Calcium 8.4 mg/dL      Total Protein 6.3 g/dL      Albumin 3.7 g/dL      ALT (SGPT) 24 U/L      AST (SGOT) 18 U/L      Alkaline Phosphatase 48 U/L      Total Bilirubin 0.4 mg/dL      Globulin 2.6 gm/dL      Comment: Calculated Result        A/G Ratio 1.4 g/dL      BUN/Creatinine Ratio 18.6     Anion Gap 8.0 mmol/L      eGFR 113.0 mL/min/1.73     Narrative:      GFR Categories in Chronic Kidney Disease (CKD)      GFR Category          GFR (mL/min/1.73)    Interpretation  G1                     90 or greater         Normal or high (1)  G2                      60-89                Mild decrease (1)  G3a                   45-59            "     Mild to moderate decrease  G3b                   30-44                Moderate to severe decrease  G4                    15-29                Severe decrease  G5                    14 or less           Kidney failure          (1)In the absence of evidence of kidney disease, neither GFR category G1 or G2 fulfill the criteria for CKD.    eGFR calculation 2021 CKD-EPI creatinine equation, which does not include race as a factor    Iron [[id number]]  (Abnormal) Collected: 02/19/25 1141    Specimen: Blood Updated: 02/19/25 1240     Iron 55 mcg/dL     CBC & Differential [[id number]]  (Abnormal) Collected: 02/19/25 1141    Specimen: Blood Updated: 02/19/25 1222    Narrative:      The following orders were created for panel order CBC & Differential.  Procedure                               Abnormality         Status                     ---------                               -----------         ------                     CBC Auto Differential[[id number]]        Abnormal            Final result                 Please view results for these tests on the individual orders.    CBC Auto Differential [[id number]]  (Abnormal) Collected: 02/19/25 1141    Specimen: Blood Updated: 02/19/25 1222     WBC 11.94 10*3/mm3      RBC 4.41 10*6/mm3      Hemoglobin 12.6 g/dL      Hematocrit 40.2 %      MCV 91.2 fL      MCH 28.6 pg      MCHC 31.3 g/dL      RDW 13.7 %      RDW-SD 46.5 fl      MPV 9.7 fL      Platelets 269 10*3/mm3      Neutrophil % 69.4 %      Lymphocyte % 20.6 %      Monocyte % 9.3 %      Eosinophil % 0.1 %      Basophil % 0.3 %      Immature Grans % 0.3 %      Neutrophils, Absolute 8.29 10*3/mm3      Lymphocytes, Absolute 2.46 10*3/mm3      Monocytes, Absolute 1.11 10*3/mm3      Eosinophils, Absolute 0.01 10*3/mm3      Basophils, Absolute 0.03 10*3/mm3      Immature Grans, Absolute 0.04 10*3/mm3      nRBC 0.0 /100 WBC     Tissue Pathology Exam [[id number]] Collected: 02/18/25 0803    Specimen: Tissue from Stomach Updated:  "02/19/25 1208     Case Report --     Surgical Pathology Report                         Case: [id number]                                  Authorizing Provider:  Tracee Michel MD  Collected:           02/18/2025 08:03 AM          Ordering Location:     [facility]   Received:            02/18/2025 09:35 AM                                 OR                                                                           Pathologist:           Omar Koch MD                                                        Specimen:    Stomach, SUB-TOTAL GASTRECTOMY                                                              Clinical Information --     Morbid obesity with BMI of 60.0-69.9, adult         Final Diagnosis --     STOMACH, SUBTOTAL GASTRECTOMY:                Gastric tissue with no signficant histopathologic change.  Negative for intestinal metaplasia and dysplasia.  No Helicobacter pylori-like organisms identified on routine stains.       Gross Description --     1. Stomach.  Received in formalin labeled \"sub-total gastrectomy\" is a 22 x 4 x 3 cm unoriented portion of stomach with a minimal amount of attached fat and a staple line along one side.  The serosa is smooth, tan-pink and glistening.  The lumen contains a moderate amount of red viscous blood.  The mucosa is tan-red, glistening and has a normal rugal folding pattern.  No masses, polyps or ulcers are grossly identified.  Representative sections are submitted in blocks 1A-1C.  LDP         Microscopic Description --     The slides are reviewed and demonstrate histopathologic features supporting the above rendered diagnosis.                  Assessment & Plan     POD # 1 s/p robotic sleeve gastrectomy.  Doing well clinically, would like to go home and does meet discharge criteria.  Mild iron deficiency anemia without evidence of bleeding on Lovenox.  Mild leukocytosis without significant shift and no evidence of infection.    Plan: " Discharge home.  Discharge instructions discussed.  Follow-up in the office in 1 to 2 weeks and call in the meantime with any problems questions or concerns.  Reiterated avoiding ulcerogenic agents for the next 6 to 8 weeks.  He says he has his Eliquis at home.  Prescriptions for Roxicodone, Zofran, omeprazole.  See orders.               02/19/25  16:46 EST  Bhupendra Adorno MD for Dr. Adorno

## 2025-02-20 NOTE — PAYOR COMM NOTE
"Fay Hoffman RN  [facility]  Utilization Management  P:[phone]  F:[phone]    Ref # [id number]   DC 2/19/25  No dc summary available    Emmett Hudson (39 y.o. Male)       Date of Birth   1985    Social Security Number       Address   [address] [address]    Home Phone   [phone]    MRN   [id number]       [facility]    Marital Status                               Admission Date   2/18/25    Admission Type   Elective    Admitting Provider   Tracee Michel MD    Attending Provider       Department, Room/Bed   [facility] 2F, S207/1       Discharge Date   2/19/2025    Discharge Disposition   Home or Self Care    Discharge Destination                                 Attending Provider: (none)   Allergies: No Known Allergies    Isolation: None   Infection: None   Code Status: Prior    Ht: 166.4 cm (65.5\")   Wt: 179 kg (394 lb)    Admission Cmt: None   Principal Problem: Morbid obesity with BMI of 60.0-69.9, adult [E66.01,Z68.44]                   Active Insurance as of 2/18/2025       Primary Coverage       Payor Plan Insurance Group Employer/Plan Group    [facility] BLUE [employer] [facility] [employer] EMPLOYEE [id number]       Payor Plan Address Payor Plan Phone Number Payor Plan Fax Number Effective Dates    [address] [phone]  1/1/2024 - None Entered    [place] [address]         Subscriber Name Subscriber Birth Date Member ID       CLAUDIA HUDSON 7/6/1987 [id number]                     Emergency Contacts        (Rel.) Home Phone Work Phone Mobile Phone    claudia hudson (Spouse) [phone] -- [phone]              Discharge Summary          Bhupendra Adorno MD   Physician  Surgery     Progress Notes     Signed     Date of Service: 02/19/25 1646  Creation Time: 02/19/25 1646     Signed       Expand All Collapse All    Bariatric Surgery Progress Note:        Chief Complaint:  POD #1  \"feel ok\"      " "  Subjective  He is sitting up at the side of the bed.  His wife is in the room.  He looks and feels well and wants to go home.  Ambulating the halls and voiding well.  Tolerating his diet without nausea or vomiting.  Passing flatus, no bowel movement, no pulmonary complaints, spirometer over 2000 observed.        Objective  Vital Signs  Blood pressure 152/94, pulse 109, temperature 97.8 °F (36.6 °C), temperature source Oral, resp. rate 16, height 166.4 cm (65.5\"), weight (!) 179 kg (394 lb), SpO2 93%.  Pulse 109 blood pressure 152/94     Intake/Output Summary (Last 24 hours) at 2/19/2025 1646  Last data filed at 2/19/2025 0924      Gross per 24 hour   Intake 2085 ml   Output --   Net 2085 ml     - NR     Physical Exam:  He is in no apparent distress.  Abdomen soft, appropriately tender, nondistended, bowel sounds active.  Wounds look okay.                   Labs:  Lab Results (last 24 hours)         Procedure Component Value Units Date/Time     Comprehensive Metabolic Panel [[id number]]  (Abnormal) Collected: 02/19/25 1338     Specimen: Blood Updated: 02/19/25 1427       Glucose 86 mg/dL         BUN 16 mg/dL         Creatinine 0.86 mg/dL         Sodium 138 mmol/L         Potassium 4.0 mmol/L         Chloride 104 mmol/L         CO2 26.0 mmol/L         Calcium 8.4 mg/dL         Total Protein 6.3 g/dL         Albumin 3.7 g/dL         ALT (SGPT) 24 U/L         AST (SGOT) 18 U/L         Alkaline Phosphatase 48 U/L         Total Bilirubin 0.4 mg/dL         Globulin 2.6 gm/dL         Comment: Calculated Result          A/G Ratio 1.4 g/dL         BUN/Creatinine Ratio 18.6       Anion Gap 8.0 mmol/L         eGFR 113.0 mL/min/1.73       Narrative:       GFR Categories in Chronic Kidney Disease (CKD)                         GFR Category          GFR (mL/min/1.73)    Interpretation  G1                       90 or greater              Normal or high (1)  G2                               60-89                Mild decrease " (1)  G3a                   45-59                Mild to moderate decrease  G3b                   30-44                Moderate to severe decrease  G4                    15-29                Severe decrease  G5                    14 or less           Kidney failure                                                 (1)In the absence of evidence of kidney disease, neither GFR category G1 or G2 fulfill the criteria for CKD.     eGFR calculation 2021 CKD-EPI creatinine equation, which does not include race as a factor     Iron [[id number]]  (Abnormal) Collected: 02/19/25 1141     Specimen: Blood Updated: 02/19/25 1240       Iron 55 mcg/dL       CBC & Differential [[id number]]  (Abnormal) Collected: 02/19/25 1141     Specimen: Blood Updated: 02/19/25 1222     Narrative:       The following orders were created for panel order CBC & Differential.  Procedure                               Abnormality         Status                     ---------                               -----------         ------                     CBC Auto Differential[[id number]]        Abnormal            Final result                  Please view results for these tests on the individual orders.     CBC Auto Differential [[id number]]  (Abnormal) Collected: 02/19/25 1141     Specimen: Blood Updated: 02/19/25 1222       WBC 11.94 10*3/mm3         RBC 4.41 10*6/mm3         Hemoglobin 12.6 g/dL         Hematocrit 40.2 %         MCV 91.2 fL         MCH 28.6 pg         MCHC 31.3 g/dL         RDW 13.7 %         RDW-SD 46.5 fl         MPV 9.7 fL         Platelets 269 10*3/mm3         Neutrophil % 69.4 %         Lymphocyte % 20.6 %         Monocyte % 9.3 %         Eosinophil % 0.1 %         Basophil % 0.3 %         Immature Grans % 0.3 %         Neutrophils, Absolute 8.29 10*3/mm3         Lymphocytes, Absolute 2.46 10*3/mm3         Monocytes, Absolute 1.11 10*3/mm3         Eosinophils, Absolute 0.01 10*3/mm3         Basophils, Absolute 0.03 10*3/mm3         Immature  "Grans, Absolute 0.04 10*3/mm3         nRBC 0.0 /100 WBC       Tissue Pathology Exam [[id number]] Collected: 02/18/25 0803     Specimen: Tissue from Stomach Updated: 02/19/25 1208       Case Report --       Surgical Pathology Report                         Case: [id number]                                  Authorizing Provider:  Tracee Michel MD  Collected:           02/18/2025 08:03 AM          Ordering Location:     [facility]   Received:            02/18/2025 09:35 AM                                 OR                                                                           Pathologist:           Omar Koch MD                                                        Specimen:    Stomach, SUB-TOTAL GASTRECTOMY                                                                 Clinical Information --       Morbid obesity with BMI of 60.0-69.9, adult             Final Diagnosis --       STOMACH, SUBTOTAL GASTRECTOMY:                Gastric tissue with no signficant histopathologic change.  Negative for intestinal metaplasia and dysplasia.  No Helicobacter pylori-like organisms identified on routine stains.          Gross Description --       1. Stomach.  Received in formalin labeled \"sub-total gastrectomy\" is a 22 x 4 x 3 cm unoriented portion of stomach with a minimal amount of attached fat and a staple line along one side.  The serosa is smooth, tan-pink and glistening.  The lumen contains a moderate amount of red viscous blood.  The mucosa is tan-red, glistening and has a normal rugal folding pattern.  No masses, polyps or ulcers are grossly identified.  Representative sections are submitted in blocks 1A-1C.  LDP             Microscopic Description --       The slides are reviewed and demonstrate histopathologic features supporting the above rendered diagnosis.                           Assessment & Plan  POD # 1 s/p robotic sleeve gastrectomy.  Doing well clinically, would like to go home " and does meet discharge criteria.  Mild iron deficiency anemia without evidence of bleeding on Lovenox.  Mild leukocytosis without significant shift and no evidence of infection.     Plan: Discharge home.  Discharge instructions discussed.  Follow-up in the office in 1 to 2 weeks and call in the meantime with any problems questions or concerns.  Reiterated avoiding ulcerogenic agents for the next 6 to 8 weeks.  He says he has his Eliquis at home.  Prescriptions for Roxicodone, Zofran, omeprazole.  See orders.                     02/19/25  16:46 EST  Bhupendra Adorno MD for Dr. Adorno                     No notes of this type exist for this encounter.

## 2025-02-25 ENCOUNTER — OFFICE VISIT (OUTPATIENT)
Dept: BARIATRICS/WEIGHT MGMT | Facility: CLINIC | Age: 40
End: 2025-02-25
Payer: COMMERCIAL

## 2025-02-25 VITALS
DIASTOLIC BLOOD PRESSURE: 80 MMHG | OXYGEN SATURATION: 96 % | RESPIRATION RATE: 18 BRPM | TEMPERATURE: 97.7 F | HEART RATE: 86 BPM | BODY MASS INDEX: 50.62 KG/M2 | SYSTOLIC BLOOD PRESSURE: 122 MMHG | HEIGHT: 66 IN | WEIGHT: 315 LBS

## 2025-02-25 DIAGNOSIS — Z98.84 S/P BARIATRIC SURGERY: Primary | ICD-10-CM

## 2025-02-25 RX ORDER — URSODIOL 300 MG/1
300 CAPSULE ORAL 2 TIMES DAILY
Qty: 60 CAPSULE | Refills: 5 | Status: SHIPPED | OUTPATIENT
Start: 2025-02-25

## 2025-02-25 RX ORDER — OMEPRAZOLE 40 MG/1
40 CAPSULE, DELAYED RELEASE ORAL DAILY
Qty: 60 CAPSULE | Refills: 0 | Status: SHIPPED | OUTPATIENT
Start: 2025-02-25

## 2025-02-25 NOTE — PROGRESS NOTES
[facility] Bariatric Surgery  [address]  [address]  [facility] [address] [phone]      Patient Name:  Emmett Valladares  :  1985      Date of Visit: 2025      Reason for Visit:  POD #7    HPI:  Emmett Valladares is a 39 y.o. male s/p RSG 25 w/ Dr. Michel    Discharged on POD#1.  Doing well.  No issues/concerns.  Denies dysphagia, reflux, nausea, vomiting, abdominal pain, diarrhea, constipation, pulmonary issues, and fevers.  Tolerating diet progression - on stage 1.  Getting 50g prot/day - drinking 2 [employer] shakes/day.  No issues w/ hydration.  Voiding well.  Not yet taking vitamins.  Not taking Omeprazole, says has not needed.  On Eliquis .  Needs Actigall RX.  Holding NSAIDs .  Ambulating frequently.     Presurgery weight: 394 pounds.  Today's weight is (!) 164 kg (362 lb 6.4 oz) pounds, today's  Body mass index is 59.39 kg/m²., and weight loss since surgery is 32 pounds.       Final Diagnosis   STOMACH, SUBTOTAL GASTRECTOMY:                Gastric tissue with no signficant histopathologic change.  Negative for intestinal metaplasia and dysplasia.  No Helicobacter pylori-like organisms identified on routine stains.       Past Medical History:   Diagnosis Date    Fatigue     Former smoker     quit 4 yrs ago    Hypertension     Insomnia     Joint pain     daily NSAIDs, prn steroid joint injections R knee ()    Prediabetes     Sleep apnea     CPAP compliant    Wears glasses      Past Surgical History:   Procedure Laterality Date    GASTRIC SLEEVE LAPAROSCOPIC N/A 2025    Procedure: GASTRIC SLEEVE LAPAROSCOPIC WITH DAVINCI ROBOT;  Surgeon: Tracee Michel MD;  Location: [facility];  Service: Robotics - DaVinci;  Laterality: N/A;    TONSILLECTOMY       Outpatient Medications Marked as Taking for the 25 encounter (Office Visit) with Gaye Davidson PA   Medication Sig Dispense Refill    apixaban (Eliquis) 2.5 MG tablet tablet Take 1  "tablet by mouth 2 (Two) Times a Day. 42 tablet 0    losartan (COZAAR) 25 MG tablet Take 1 tablet by mouth Daily. 90 tablet 1    traZODone (DESYREL) 50 MG tablet Take 1 tablet by mouth every night at bedtime. (Patient taking differently: Take 1 tablet by mouth Every Night.) 30 tablet 0     No Known Allergies    Social History     Socioeconomic History    Marital status:    Tobacco Use    Smoking status: Former     Current packs/day: 0.00     Types: Cigarettes     Quit date: 10/2020     Years since quittin.4     Passive exposure: Past    Smokeless tobacco: Never   Vaping Use    Vaping status: Never Used   Substance and Sexual Activity    Alcohol use: Not Currently     Comment: very occasional, few times per year    Drug use: Never    Sexual activity: Defer     Social History     Social History Narrative    Patient lives in [place], [place]. He is  with 4 children. He works full-time as a  at [employer] in [place].        /80 (BP Location: Left arm, Patient Position: Sitting, Cuff Size: Adult)   Pulse 86   Temp 97.7 °F (36.5 °C) (Temporal)   Resp 18   Ht 166.4 cm (65.5\")   Wt (!) 164 kg (362 lb 6.4 oz)   SpO2 96%   BMI 59.39 kg/m²     Physical Exam  Constitutional:       Appearance: He is well-developed.   Cardiovascular:      Rate and Rhythm: Normal rate and regular rhythm.   Pulmonary:      Effort: Pulmonary effort is normal.   Abdominal:      Palpations: Abdomen is soft.      Tenderness: There is no abdominal tenderness.      Hernia: No hernia is present.      Comments: incisions healing well   Musculoskeletal:         General: Normal range of motion.   Skin:     General: Skin is warm and dry.   Neurological:      Mental Status: He is alert.           Assessment:   POD #7 s/p RSG 25 w/ Dr. Michel      Class 3 Severe Obesity (BMI >=40). Obesity-related health conditions include the following:  see above . Obesity is improving with treatment. BMI is is above average; BMI " management plan is completed. We discussed  see plan .       Plan:  Doing well.  Continue to advance diet per manual.  Increase protein to 100g/day.  Increase exercise/activity as tolerated.  Reviewed lifting restrictions, nothing >25 lbs x 2 more weeks.  Start vitamins as discussed.  Start PPI daily - very important, new RX escribed.  Start Actigall as prescribed.  Continue to avoid ASA/NSAIDS/tobacco x 6 weeks postop, steroids x 8 weeks postop.  Call w/ problems/concerns.    The patient was instructed to follow up in 3 weeks, sooner if needed.

## 2025-03-11 ENCOUNTER — OFFICE VISIT (OUTPATIENT)
Dept: BARIATRICS/WEIGHT MGMT | Facility: CLINIC | Age: 40
End: 2025-03-11
Payer: COMMERCIAL

## 2025-03-11 VITALS
BODY MASS INDEX: 50.62 KG/M2 | DIASTOLIC BLOOD PRESSURE: 88 MMHG | WEIGHT: 315 LBS | HEIGHT: 66 IN | RESPIRATION RATE: 16 BRPM | HEART RATE: 67 BPM | TEMPERATURE: 97.7 F | OXYGEN SATURATION: 98 % | SYSTOLIC BLOOD PRESSURE: 134 MMHG

## 2025-03-11 DIAGNOSIS — K91.2 POSTGASTRECTOMY MALABSORPTION: ICD-10-CM

## 2025-03-11 DIAGNOSIS — Z90.3 POSTGASTRECTOMY MALABSORPTION: ICD-10-CM

## 2025-03-11 DIAGNOSIS — R79.0 ABNORMAL BLOOD LEVEL OF IRON: ICD-10-CM

## 2025-03-11 DIAGNOSIS — R11.2 NAUSEA AND VOMITING, UNSPECIFIED VOMITING TYPE: ICD-10-CM

## 2025-03-11 DIAGNOSIS — E55.9 VITAMIN D DEFICIENCY: ICD-10-CM

## 2025-03-11 DIAGNOSIS — R53.83 FATIGUE, UNSPECIFIED TYPE: ICD-10-CM

## 2025-03-11 DIAGNOSIS — G89.18 POSTOPERATIVE ABDOMINAL PAIN: Primary | ICD-10-CM

## 2025-03-11 DIAGNOSIS — R10.9 POSTOPERATIVE ABDOMINAL PAIN: Primary | ICD-10-CM

## 2025-03-11 PROCEDURE — 99024 POSTOP FOLLOW-UP VISIT: CPT | Performed by: PHYSICIAN ASSISTANT

## 2025-03-11 NOTE — PROGRESS NOTES
"[facility] Bariatric Surgery  [address]  [address]  [facility] [id number] [phone]      Patient Name:  Emmett Valladares  :  1985      Date of Visit: 2025      Reason for Visit:  POD #21    HPI:  Emmett Valladares is a 40 y.o. male s/p RSG 25 w/ Dr. Michel    Returns to clinic sooner than scheduled 1 month followup - \"hurting\" - c/o sharp shooting pains for the past couple weeks, start in LLQ, radiates toward umbilicus, occurring several times a day, lasts only seconds and then resolves.  Worse w/ positional changes.  No associated symptoms.  Has pain medication, but not taking.  Declines trigger point injection.      Also c/o nausea w/ any PO intake.  Has associated epigastric pain/fullness/regurgitation after just a couple bites/sips.  Notes several episode of vomiting over the last several days.  Getting only 60g prot/day, on stage 3 diet.  Has not tried taking Zofran - did not know he had RX.  Denies heartburn/reflux, taking Omeprazole as prescribed.  Having daily stools.  Denies fevers/chills.  Started Actigall, but only got 10 doses when pharmacy filled RX.  Finishing Eliquis RX. Ambulating frequently, back to work.        Presurgery weight: 394 pounds.  Today's weight is (!) 161 kg (355 lb) pounds, today's  Body mass index is 58.18 kg/m²., and weight loss since surgery is 39 pounds.         Past Medical History:   Diagnosis Date    Fatigue     Former smoker     quit 4 yrs ago    Hypertension     Insomnia     Joint pain     daily NSAIDs, prn steroid joint injections R knee ()    Prediabetes     Sleep apnea     CPAP compliant    Wears glasses      Past Surgical History:   Procedure Laterality Date    GASTRIC SLEEVE LAPAROSCOPIC N/A 2025    Procedure: GASTRIC SLEEVE LAPAROSCOPIC WITH DAVINCI ROBOT;  Surgeon: Tracee Michel MD;  Location: [facility];  Service: Robotics - DaVinci;  Laterality: N/A;    TONSILLECTOMY       Outpatient " "Medications Marked as Taking for the 3/11/25 encounter (Office Visit) with Gaye Davidson PA   Medication Sig Dispense Refill    apixaban (Eliquis) 2.5 MG tablet tablet Take 1 tablet by mouth 2 (Two) Times a Day. 42 tablet 0    losartan (COZAAR) 25 MG tablet Take 1 tablet by mouth Daily. 90 tablet 1    omeprazole (priLOSEC) 40 MG capsule Take 1 capsule by mouth Daily for 60 days. 60 capsule 0    traZODone (DESYREL) 50 MG tablet Take 1 tablet by mouth every night at bedtime. (Patient taking differently: Take 1 tablet by mouth Every Night.) 30 tablet 0     No Known Allergies    Social History     Socioeconomic History    Marital status:    Tobacco Use    Smoking status: Former     Current packs/day: 0.00     Types: Cigarettes     Quit date: 10/2020     Years since quittin.4     Passive exposure: Past    Smokeless tobacco: Never   Vaping Use    Vaping status: Never Used   Substance and Sexual Activity    Alcohol use: Not Currently     Comment: very occasional, few times per year    Drug use: Never    Sexual activity: Defer     Social History     Social History Narrative    Lives in [place], [place].   w/ children.  Works @ [employer] in [place].        /88 (BP Location: Left arm, Patient Position: Sitting)   Pulse 67   Temp 97.7 °F (36.5 °C)   Resp 16   Ht 166.4 cm (65.5\")   Wt (!) 161 kg (355 lb)   SpO2 98%   BMI 58.18 kg/m²     Physical Exam  Constitutional:       Appearance: He is well-developed.   Cardiovascular:      Rate and Rhythm: Normal rate and regular rhythm.   Pulmonary:      Effort: Pulmonary effort is normal.   Abdominal:      General: Bowel sounds are normal. There is no distension.      Palpations: Abdomen is soft. There is no mass.      Tenderness: There is no abdominal tenderness. There is no guarding or rebound.      Hernia: No hernia is present.      Comments: incisions healing well   Musculoskeletal:         General: Normal range of motion.   Skin:     General: Skin " is warm and dry.   Neurological:      Mental Status: He is alert.           Assessment:   POD #21 s/p RSG 2/18/25 w/ Dr. Michel      ICD-10-CM ICD-9-CM   1. Postoperative abdominal pain  R10.9 789.00    G89.18 338.18   2. 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 Vitamin D deficiency  E55.9 268.9   5. Abnormal blood level of iron  R79.0 790.6   6. Fatigue, unspecified type  R53.83 780.79       Class 3 Severe Obesity (BMI >=40). Obesity-related health conditions include the following:  see above . Obesity is improving with treatment. BMI is is above average; BMI management plan is completed. We discussed  see plan .       Plan:  Discussed w/ Dr. Michel.  Labs + CT ab/pel w/ IV/PO (water-soluble) contrast ordered for further eval.  May need UGI + GBUS pending results, ongoing issues.  Discussed importance of increasing daily protein intake.  Take Zofran as needed.  Continue to avoid ASA/NSAIDS/steroids/tobacco.  Additional input pending results.        The patient was instructed to follow up in 1 week, sooner if needed.

## 2025-03-13 ENCOUNTER — LAB (OUTPATIENT)
Dept: LAB | Facility: HOSPITAL | Age: 40
End: 2025-03-13
Payer: COMMERCIAL

## 2025-03-13 ENCOUNTER — HOSPITAL ENCOUNTER (OUTPATIENT)
Dept: CT IMAGING | Facility: HOSPITAL | Age: 40
Discharge: HOME | End: 2025-03-13
Payer: COMMERCIAL

## 2025-03-13 DIAGNOSIS — Z90.3 POSTGASTRECTOMY MALABSORPTION: ICD-10-CM

## 2025-03-13 DIAGNOSIS — G89.18 POSTOPERATIVE ABDOMINAL PAIN: ICD-10-CM

## 2025-03-13 DIAGNOSIS — R10.9 POSTOPERATIVE ABDOMINAL PAIN: ICD-10-CM

## 2025-03-13 DIAGNOSIS — R53.83 FATIGUE, UNSPECIFIED TYPE: ICD-10-CM

## 2025-03-13 DIAGNOSIS — R11.2 NAUSEA AND VOMITING, UNSPECIFIED VOMITING TYPE: ICD-10-CM

## 2025-03-13 DIAGNOSIS — K91.2 POSTGASTRECTOMY MALABSORPTION: ICD-10-CM

## 2025-03-13 DIAGNOSIS — E55.9 VITAMIN D DEFICIENCY: ICD-10-CM

## 2025-03-13 DIAGNOSIS — R79.0 ABNORMAL BLOOD LEVEL OF IRON: ICD-10-CM

## 2025-03-13 LAB
25(OH)D3 SERPL-MCNC: 33 NG/ML (ref 30–100)
ALBUMIN SERPL-MCNC: 4 G/DL (ref 3.5–5.2)
ALBUMIN/GLOB SERPL: 1.5 G/DL
ALP SERPL-CCNC: 62 U/L (ref 39–117)
ALT SERPL W P-5'-P-CCNC: 41 U/L (ref 1–41)
ANION GAP SERPL CALCULATED.3IONS-SCNC: 13 MMOL/L (ref 5–15)
AST SERPL-CCNC: 31 U/L (ref 1–40)
BASOPHILS # BLD AUTO: 0.05 10*3/MM3 (ref 0–0.2)
BASOPHILS NFR BLD AUTO: 0.8 % (ref 0–1.5)
BILIRUB SERPL-MCNC: 0.5 MG/DL (ref 0–1.2)
BUN SERPL-MCNC: 20 MG/DL (ref 6–20)
BUN/CREAT SERPL: 21.5 (ref 7–25)
CALCIUM SPEC-SCNC: 9.6 MG/DL (ref 8.6–10.5)
CHLORIDE SERPL-SCNC: 105 MMOL/L (ref 98–107)
CO2 SERPL-SCNC: 24 MMOL/L (ref 22–29)
CREAT SERPL-MCNC: 0.93 MG/DL (ref 0.76–1.27)
DEPRECATED RDW RBC AUTO: 44 FL (ref 37–54)
EGFRCR SERPLBLD CKD-EPI 2021: 106.5 ML/MIN/1.73
EOSINOPHIL # BLD AUTO: 0.2 10*3/MM3 (ref 0–0.4)
EOSINOPHIL NFR BLD AUTO: 3.2 % (ref 0.3–6.2)
ERYTHROCYTE [DISTWIDTH] IN BLOOD BY AUTOMATED COUNT: 13.8 % (ref 12.3–15.4)
FERRITIN SERPL-MCNC: 146 NG/ML (ref 30–400)
FOLATE SERPL-MCNC: 7.31 NG/ML (ref 4.78–24.2)
GLOBULIN UR ELPH-MCNC: 2.7 GM/DL
GLUCOSE SERPL-MCNC: 79 MG/DL (ref 65–99)
HCT VFR BLD AUTO: 42.7 % (ref 37.5–51)
HGB BLD-MCNC: 14.4 G/DL (ref 13–17.7)
IMM GRANULOCYTES # BLD AUTO: 0.01 10*3/MM3 (ref 0–0.05)
IMM GRANULOCYTES NFR BLD AUTO: 0.2 % (ref 0–0.5)
IRON 24H UR-MRATE: 73 MCG/DL (ref 59–158)
LYMPHOCYTES # BLD AUTO: 2.37 10*3/MM3 (ref 0.7–3.1)
LYMPHOCYTES NFR BLD AUTO: 38 % (ref 19.6–45.3)
MCH RBC QN AUTO: 29.9 PG (ref 26.6–33)
MCHC RBC AUTO-ENTMCNC: 33.7 G/DL (ref 31.5–35.7)
MCV RBC AUTO: 88.6 FL (ref 79–97)
MONOCYTES # BLD AUTO: 0.71 10*3/MM3 (ref 0.1–0.9)
MONOCYTES NFR BLD AUTO: 11.4 % (ref 5–12)
NEUTROPHILS NFR BLD AUTO: 2.9 10*3/MM3 (ref 1.7–7)
NEUTROPHILS NFR BLD AUTO: 46.4 % (ref 42.7–76)
NRBC BLD AUTO-RTO: 0 /100 WBC (ref 0–0.2)
PLATELET # BLD AUTO: 245 10*3/MM3 (ref 140–450)
PMV BLD AUTO: 12 FL (ref 6–12)
POTASSIUM SERPL-SCNC: 4.4 MMOL/L (ref 3.5–5.2)
PREALB SERPL-MCNC: 20.2 MG/DL (ref 20–40)
PROT SERPL-MCNC: 6.7 G/DL (ref 6–8.5)
RBC # BLD AUTO: 4.82 10*6/MM3 (ref 4.14–5.8)
SODIUM SERPL-SCNC: 142 MMOL/L (ref 136–145)
WBC NRBC COR # BLD AUTO: 6.24 10*3/MM3 (ref 3.4–10.8)

## 2025-03-13 PROCEDURE — 83921 ORGANIC ACID SINGLE QUANT: CPT

## 2025-03-13 PROCEDURE — 80053 COMPREHEN METABOLIC PANEL: CPT

## 2025-03-13 PROCEDURE — 82746 ASSAY OF FOLIC ACID SERUM: CPT

## 2025-03-13 PROCEDURE — 82728 ASSAY OF FERRITIN: CPT

## 2025-03-13 PROCEDURE — 84134 ASSAY OF PREALBUMIN: CPT

## 2025-03-13 PROCEDURE — 82306 VITAMIN D 25 HYDROXY: CPT

## 2025-03-13 PROCEDURE — 25510000001 IOPAMIDOL 61 % SOLUTION: Performed by: PHYSICIAN ASSISTANT

## 2025-03-13 PROCEDURE — 85025 COMPLETE CBC W/AUTO DIFF WBC: CPT

## 2025-03-13 PROCEDURE — 83540 ASSAY OF IRON: CPT

## 2025-03-13 PROCEDURE — 84425 ASSAY OF VITAMIN B-1: CPT

## 2025-03-13 PROCEDURE — 74177 CT ABD & PELVIS W/CONTRAST: CPT

## 2025-03-13 PROCEDURE — 36415 COLL VENOUS BLD VENIPUNCTURE: CPT

## 2025-03-13 RX ORDER — IOPAMIDOL 612 MG/ML
100 INJECTION, SOLUTION INTRAVASCULAR
Status: COMPLETED | OUTPATIENT
Start: 2025-03-13 | End: 2025-03-13

## 2025-03-13 RX ADMIN — IOPAMIDOL 100 ML: 612 INJECTION, SOLUTION INTRAVENOUS at 10:53

## 2025-03-16 LAB — VIT B1 BLD-SCNC: 98.5 NMOL/L (ref 66.5–200)

## 2025-03-18 LAB — METHYLMALONATE SERPL-SCNC: 189 NMOL/L (ref 0–378)

## 2025-03-26 ENCOUNTER — RESULTS FOLLOW-UP (OUTPATIENT)
Dept: BARIATRICS/WEIGHT MGMT | Facility: CLINIC | Age: 40
End: 2025-03-26
Payer: COMMERCIAL

## 2025-05-22 ENCOUNTER — OFFICE VISIT (OUTPATIENT)
Dept: BARIATRICS/WEIGHT MGMT | Facility: CLINIC | Age: 40
End: 2025-05-22
Payer: COMMERCIAL

## 2025-05-22 VITALS
SYSTOLIC BLOOD PRESSURE: 132 MMHG | OXYGEN SATURATION: 98 % | RESPIRATION RATE: 16 BRPM | WEIGHT: 315 LBS | HEIGHT: 66 IN | BODY MASS INDEX: 50.62 KG/M2 | TEMPERATURE: 97.1 F | DIASTOLIC BLOOD PRESSURE: 86 MMHG | HEART RATE: 57 BPM

## 2025-05-22 DIAGNOSIS — R79.0 ABNORMAL BLOOD LEVEL OF IRON: ICD-10-CM

## 2025-05-22 DIAGNOSIS — R10.11 RUQ PAIN: ICD-10-CM

## 2025-05-22 DIAGNOSIS — R11.0 POSTPRANDIAL NAUSEA: ICD-10-CM

## 2025-05-22 DIAGNOSIS — R53.83 FATIGUE, UNSPECIFIED TYPE: ICD-10-CM

## 2025-05-22 DIAGNOSIS — Z90.3 POSTGASTRECTOMY MALABSORPTION: ICD-10-CM

## 2025-05-22 DIAGNOSIS — E66.01 MORBID OBESITY WITH BMI OF 50.0-59.9, ADULT: Primary | ICD-10-CM

## 2025-05-22 DIAGNOSIS — K91.2 POSTGASTRECTOMY MALABSORPTION: ICD-10-CM

## 2025-05-22 DIAGNOSIS — E55.9 VITAMIN D DEFICIENCY: ICD-10-CM

## 2025-05-22 PROCEDURE — 99214 OFFICE O/P EST MOD 30 MIN: CPT | Performed by: NURSE PRACTITIONER

## 2025-05-22 RX ORDER — OMEPRAZOLE 40 MG/1
40 CAPSULE, DELAYED RELEASE ORAL DAILY
COMMUNITY

## 2025-05-22 NOTE — PROGRESS NOTES
"[facility]ariat[facility]  [address]  [address]  [facility] [id number] [phone]      Patient Name:  Emmett Valladares  :  1985      Date of Visit: 2025      Reason for Visit:  3 months postop    HPI:  Emmett Valladares is a 40 y.o. male s/p RSG 25 w/ Dr. Michel    LOV 3/11/25: \"Returns to clinic sooner than scheduled 1 month followup - \"hurting\" - c/o sharp shooting pains for the past couple weeks, start in LLQ, radiates toward umbilicus, occurring several times a day, lasts only seconds and then resolves.  Worse w/ positional changes.  No associated symptoms.  Has pain medication, but not taking.  Declines trigger point injection.      Also c/o nausea w/ any PO intake.  Has associated epigastric pain/fullness/regurgitation after just a couple bites/sips.  Notes several episode of vomiting over the last several days.  Getting only 60g prot/day, on stage 3 diet.  Has not tried taking Zofran - did not know he had RX.  Denies heartburn/reflux, taking Omeprazole as prescribed.  Having daily stools.  Denies fevers/chills.  Started Actigall, but only got 10 doses when pharmacy filled RX.  Finishing Eliquis RX. Ambulating frequently, back to work.    Discussed w/ Dr. Michel.  Labs + CT ab/pel w/ IV/PO (water-soluble) contrast ordered for further eval.  May need UGI + GBUS pending results, ongoing issues. Take Zofran as needed.\"    CT abd/pel w/ IV contrast (pt refused PO contrast) 3/13/25  IMPRESSION:  1. Nonobstructing left intrarenal stone  2. Other incidental findings as discussed above.    ----    2025:    Having right sided abdominal/side/back pain every day. Worsens after eating. Not taking any medications for the pain. Not hungry- eating very little, also scared to eat because of the pain. Nauseous daily- worse after eating. Vomits rarely. He feels like symptoms are worsening. He is eating more than he was initially post op. Has GB. Not taking " actigall.     Denies dysphagia, diarrhea, and constipation.  Reflux only when forgets PPI. On Omeprazole .      Getting ??g prot/day.  Very little. Was doing [employer] protein shakes, but cannot tolerate currently. Drinking 1.5 bottles of Gatorade, water makes him nauseous.      24 hr diet recall:  630p: 4 piece chicken nugget, 1/2 Baked potato w/ bolton and cheese  1000pm: 1/2 baked potato w/ bolton and cheese    Physical activity: active at work, walking 4-5 mi per day.    Labs 3/13/25- acceptable. Taking no vitamins. Had local skin reaction to patches.     Presurgery weight: 394 pounds.  Today's weight is (!) 146 kg (321 lb 8 oz) pounds, today's  Body mass index is 52.69 kg/m²., and weight loss since surgery is 70 pounds.       Past Medical History:   Diagnosis Date    Fatigue     Former smoker     quit 4 yrs ago    Hypertension     Insomnia     Joint pain     daily NSAIDs, prn steroid joint injections R knee ()    Prediabetes     Sleep apnea     CPAP compliant    Wears glasses      Past Surgical History:   Procedure Laterality Date    GASTRIC SLEEVE LAPAROSCOPIC N/A 2025    Procedure: GASTRIC SLEEVE LAPAROSCOPIC WITH DAVINCI ROBOT;  Surgeon: Tracee Michel MD;  Location: [facility];  Service: Robotics - [employer];  Laterality: N/A;    TONSILLECTOMY       Outpatient Medications Marked as Taking for the 25 encounter (Office Visit) with Carolyn Contreras APRN   Medication Sig Dispense Refill    omeprazole (priLOSEC) 40 MG capsule Take 1 capsule by mouth Daily.      traZODone (DESYREL) 50 MG tablet Take 1 tablet by mouth every night at bedtime. (Patient taking differently: Take 1 tablet by mouth Every Night.) 30 tablet 0     No Known Allergies    Social History     Socioeconomic History    Marital status:    Tobacco Use    Smoking status: Former     Current packs/day: 0.00     Types: Cigarettes     Quit date: 10/2020     Years since quittin.6     Passive exposure: Past     "Smokeless tobacco: Never   Vaping Use    Vaping status: Never Used   Substance and Sexual Activity    Alcohol use: Not Currently     Comment: very occasional, few times per year    Drug use: Never    Sexual activity: Defer     Social History     Social History Narrative    Lives in [place], [place].   w/ children.  Works @ [employer] in [place].        /86 (BP Location: Left arm, Patient Position: Sitting)   Pulse 57   Temp 97.1 °F (36.2 °C)   Resp 16   Ht 166.4 cm (65.5\")   Wt (!) 146 kg (321 lb 8 oz)   SpO2 98%   BMI 52.69 kg/m²     Physical Exam  Constitutional:       Appearance: He is well-developed.   Cardiovascular:      Rate and Rhythm: Normal rate and regular rhythm.   Pulmonary:      Effort: Pulmonary effort is normal.   Abdominal:      General: Bowel sounds are normal. There is no distension.      Palpations: Abdomen is soft. There is no mass.      Tenderness: There is no abdominal tenderness. There is no guarding or rebound.      Hernia: No hernia is present.   Musculoskeletal:         General: Normal range of motion.   Skin:     General: Skin is warm and dry.   Neurological:      Mental Status: He is alert.           Assessment:   3 months s/p RSG 2/18/25 w/ Dr. Michel      ICD-10-CM ICD-9-CM   1. Morbid obesity with BMI of 50.0-59.9, adult  E66.01 278.01    Z68.43 V85.43   2. Postgastrectomy malabsorption  K91.2 579.3    Z90.3    3. Vitamin D deficiency  E55.9 268.9   4. Abnormal blood level of iron  R79.0 790.6   5. Fatigue, unspecified type  R53.83 780.79   6. Postprandial nausea  R11.0 787.02   7. RUQ pain  R10.11 789.01         Class 3 Severe Obesity (BMI >=40). Obesity-related health conditions include the following:  see above . Obesity is improving with treatment. BMI is is above average; BMI management plan is completed. We discussed  see plan .       Plan:  GBUS ordered. Patient declines UGI at this time.  Discussed importance of increasing daily protein intake.  Increase " protein 100g/day. Bariatric labs ordered. Vitamin adjustments pending lab results. Additional input pending results.        The patient was instructed to follow up in 3 week, sooner if needed.     I spent 30 minutes caring for Emmett on this date of service. This time includes time spent by me in the following activities: preparing for the visit, reviewing tests, obtaining and/or reviewing a separately obtained history, performing a medically appropriate examination and/or evaluation, counseling and educating the patient/family/caregiver, ordering medications, tests, or procedures, and documenting information in the medical record.       Carolyn Contreras, APRN

## 2025-05-28 ENCOUNTER — HOSPITAL ENCOUNTER (OUTPATIENT)
Dept: ULTRASOUND IMAGING | Facility: HOSPITAL | Age: 40
Discharge: HOME | End: 2025-05-28
Admitting: NURSE PRACTITIONER
Payer: COMMERCIAL

## 2025-05-28 DIAGNOSIS — R10.11 RUQ PAIN: ICD-10-CM

## 2025-05-28 DIAGNOSIS — R11.0 POSTPRANDIAL NAUSEA: ICD-10-CM

## 2025-05-28 PROCEDURE — 76705 ECHO EXAM OF ABDOMEN: CPT

## 2025-05-29 ENCOUNTER — RESULTS FOLLOW-UP (OUTPATIENT)
Dept: BARIATRICS/WEIGHT MGMT | Facility: CLINIC | Age: 40
End: 2025-05-29
Payer: COMMERCIAL

## 2025-05-29 LAB
25(OH)D3+25(OH)D2 SERPL-MCNC: 43.4 NG/ML (ref 30–100)
ALBUMIN SERPL-MCNC: 4.4 G/DL (ref 4.1–5.1)
ALP SERPL-CCNC: 84 IU/L (ref 44–121)
ALT SERPL-CCNC: 25 IU/L (ref 0–44)
AST SERPL-CCNC: 23 IU/L (ref 0–40)
BASOPHILS # BLD AUTO: 0 X10E3/UL (ref 0–0.2)
BASOPHILS NFR BLD AUTO: 1 %
BILIRUB SERPL-MCNC: 0.7 MG/DL (ref 0–1.2)
BUN SERPL-MCNC: 17 MG/DL (ref 6–24)
BUN/CREAT SERPL: 19 (ref 9–20)
CALCIUM SERPL-MCNC: 9.8 MG/DL (ref 8.7–10.2)
CHLORIDE SERPL-SCNC: 102 MMOL/L (ref 96–106)
CO2 SERPL-SCNC: 23 MMOL/L (ref 20–29)
CREAT SERPL-MCNC: 0.88 MG/DL (ref 0.76–1.27)
EGFRCR SERPLBLD CKD-EPI 2021: 111 ML/MIN/1.73
EOSINOPHIL # BLD AUTO: 0.1 X10E3/UL (ref 0–0.4)
EOSINOPHIL NFR BLD AUTO: 2 %
ERYTHROCYTE [DISTWIDTH] IN BLOOD BY AUTOMATED COUNT: 13.5 % (ref 11.6–15.4)
FERRITIN SERPL-MCNC: 122 NG/ML (ref 30–400)
FOLATE SERPL-MCNC: 2.4 NG/ML
GLOBULIN SER CALC-MCNC: 2.6 G/DL (ref 1.5–4.5)
GLUCOSE SERPL-MCNC: 88 MG/DL (ref 70–99)
HCT VFR BLD AUTO: 47.3 % (ref 37.5–51)
HGB BLD-MCNC: 14.9 G/DL (ref 13–17.7)
IMM GRANULOCYTES # BLD AUTO: 0 X10E3/UL (ref 0–0.1)
IMM GRANULOCYTES NFR BLD AUTO: 0 %
IRON SERPL-MCNC: 61 UG/DL (ref 38–169)
LYMPHOCYTES # BLD AUTO: 2.2 X10E3/UL (ref 0.7–3.1)
LYMPHOCYTES NFR BLD AUTO: 29 %
MCH RBC QN AUTO: 29 PG (ref 26.6–33)
MCHC RBC AUTO-ENTMCNC: 31.5 G/DL (ref 31.5–35.7)
MCV RBC AUTO: 92 FL (ref 79–97)
METHYLMALONATE SERPL-SCNC: 209 NMOL/L (ref 0–378)
MONOCYTES # BLD AUTO: 0.6 X10E3/UL (ref 0.1–0.9)
MONOCYTES NFR BLD AUTO: 7 %
NEUTROPHILS # BLD AUTO: 4.7 X10E3/UL (ref 1.4–7)
NEUTROPHILS NFR BLD AUTO: 61 %
PLATELET # BLD AUTO: 276 X10E3/UL (ref 150–450)
POTASSIUM SERPL-SCNC: 4.8 MMOL/L (ref 3.5–5.2)
PREALB SERPL-MCNC: 18 MG/DL (ref 14–35)
PROT SERPL-MCNC: 7 G/DL (ref 6–8.5)
RBC # BLD AUTO: 5.13 X10E6/UL (ref 4.14–5.8)
SODIUM SERPL-SCNC: 141 MMOL/L (ref 134–144)
VIT B1 BLD-SCNC: 104.4 NMOL/L (ref 66.5–200)
WBC # BLD AUTO: 7.7 X10E3/UL (ref 3.4–10.8)

## 2025-08-21 ENCOUNTER — OFFICE VISIT (OUTPATIENT)
Dept: BARIATRICS/WEIGHT MGMT | Facility: CLINIC | Age: 40
End: 2025-08-21
Payer: COMMERCIAL

## 2025-08-21 VITALS
OXYGEN SATURATION: 97 % | RESPIRATION RATE: 18 BRPM | SYSTOLIC BLOOD PRESSURE: 116 MMHG | HEIGHT: 66 IN | HEART RATE: 58 BPM | DIASTOLIC BLOOD PRESSURE: 68 MMHG | WEIGHT: 297.2 LBS | BODY MASS INDEX: 47.76 KG/M2 | TEMPERATURE: 98 F

## 2025-08-21 DIAGNOSIS — E55.9 VITAMIN D DEFICIENCY: ICD-10-CM

## 2025-08-21 DIAGNOSIS — E66.01 MORBID OBESITY WITH BMI OF 45.0-49.9, ADULT: Primary | ICD-10-CM

## 2025-08-21 DIAGNOSIS — R79.0 ABNORMAL BLOOD LEVEL OF IRON: ICD-10-CM

## 2025-08-21 DIAGNOSIS — Z90.3 POSTGASTRECTOMY MALABSORPTION: ICD-10-CM

## 2025-08-21 DIAGNOSIS — K91.2 POSTGASTRECTOMY MALABSORPTION: ICD-10-CM

## 2025-08-21 DIAGNOSIS — E53.8 FOLATE DEFICIENCY: ICD-10-CM

## 2025-08-21 DIAGNOSIS — R53.83 FATIGUE, UNSPECIFIED TYPE: ICD-10-CM

## 2025-08-21 PROCEDURE — 99214 OFFICE O/P EST MOD 30 MIN: CPT | Performed by: NURSE PRACTITIONER

## (undated) DEVICE — GLV SURG DERMASSURE GRN LF PF 7.0

## (undated) DEVICE — REDUCER: Brand: ENDOWRIST

## (undated) DEVICE — UNDRPD COMFRT GLD DRYPAD 36X57IN

## (undated) DEVICE — ARM DRAPE

## (undated) DEVICE — BLADELESS OBTURATOR, LONG: Brand: WECK VISTA

## (undated) DEVICE — THE DEVICE IS A DISPOSABLE, LIGATURE PASSING, SUTURING APPARATUS AND NEEDLE GUIDE FOR THE ABDOMINAL WALL WHICH IS NON-POWERED, HAND-HELD, AND HAND-MANIPULATED,INTENDED TO BE USED IN VARIOUS GENERAL SURGICAL PROCEDURES. THE DEVICE INCLUDES A LIGATURE CARRIER PATHWAY, NEEDLE GUIDE, TWO NEEDLES, REFERENCE PLANE T-BAR, AND A GUIDEWIRE. THE HANDLE OF THE DEVICE PROVIDES TWO DIAMETRICALLY OPPOSED ENCLOSED GUIDEWAYS FOR THE ADVANCEMENT AND RETRACTION OF THE NEEDLES UNDER MANUAL CONTROL OF A PLUNGER LOCATED AT THE PROXIMAL END OF THE DEVICE.AS PART OF THE M-CLOSE CONVENIENCE KIT, A NERVE BLOCK NEEDLE IS INCLUDED FOR THE ADMINISTRATION OF LOCAL ANESTHETIC AGENTS TO PROVIDE REGIONAL AND LOCAL ANESTHESIA.  A TELFA ANTIMICROBIAL, NON-ADHERENT PAD IS ALSO PROVIDED IN THE KIT FOR USE AS A PRIMARY DRESSING FOR THE SURGICAL INCISION.: Brand: M-CLOSE KIT

## (undated) DEVICE — IRRIGATOR BULB ASEPTO 60CC STRL

## (undated) DEVICE — LAPAROVUE VISIBILITY SYSTEM LAPAROSCOPIC SOLUTIONS: Brand: LAPAROVUE

## (undated) DEVICE — MICRO HVTSA, 0.5G AND HVTSA SOURCEMARK PRODUCT CODE M1206 AND M1206-01: Brand: EXOFIN MICRO HVTSA, 0.5G

## (undated) DEVICE — ST TBG AIRSEAL BIF FLTR W/ACT/CHARCOAL/FLTR

## (undated) DEVICE — SYS CALIB SLV/GASTRECTOMY VISIGI W/BULB 36F STD

## (undated) DEVICE — SHT AIR TRANSFR COMFRT GLIDE LAT 40X80IN

## (undated) DEVICE — DEV CONTRL TISS STRATAFIX SPIRAL PDS PLS CT1 2-0 45CM
Type: IMPLANTABLE DEVICE | Site: ABDOMEN | Status: NON-FUNCTIONAL
Removed: 2025-02-18

## (undated) DEVICE — APL DUPLOSPRAYER MIS 40CM

## (undated) DEVICE — COLUMN DRAPE

## (undated) DEVICE — GOWN,SIRUS,NONRNF,SETINSLV,XL,20/CS: Brand: MEDLINE

## (undated) DEVICE — GLV SURG SENSICARE PI MIC PF SZ6.5 LF STRL

## (undated) DEVICE — PK BARIATRIC 10

## (undated) DEVICE — SEALANT WND FIBRIN TISSEEL PREFIL/SYR/PRIMAFZ 4ML

## (undated) DEVICE — VESSEL SEALER EXTEND: Brand: ENDOWRIST

## (undated) DEVICE — BLANKT WARM UPPR/BDY ARM/OUT 57X196CM

## (undated) DEVICE — CONTN GRAD MEAS TRIANG 32OZ BLK

## (undated) DEVICE — DEFENDO AIR WATER SUCTION AND BIOPSY VALVE KIT FOR  OLYMPUS: Brand: DEFENDO AIR/WATER/SUCTION AND BIOPSY VALVE

## (undated) DEVICE — SEAL

## (undated) DEVICE — STAPLER 60: Brand: SUREFORM

## (undated) DEVICE — AIRSEAL 8 MM CANNULA CAP AND OBTURATOR WITH BLADELESS OPTICAL TIP COMPATIBLE WITH INTUITIVE DA VINCI XI AND DA VINCI X 8 MM INSTRUMENT CANNULA, LONG LENGTH: Brand: ARS LONG